# Patient Record
Sex: FEMALE | Race: WHITE | URBAN - METROPOLITAN AREA
[De-identification: names, ages, dates, MRNs, and addresses within clinical notes are randomized per-mention and may not be internally consistent; named-entity substitution may affect disease eponyms.]

---

## 2017-07-05 ENCOUNTER — TELEPHONE (OUTPATIENT)
Dept: TRANSPLANT | Facility: CLINIC | Age: 42
End: 2017-07-05

## 2017-07-05 NOTE — TELEPHONE ENCOUNTER
"Logged in as: [email or web address]. Logout.  Incomplete   Pending   Registered   Archived Change Log Done Living Kidney Donor Evaluation Completed: 2017 13:10:40 CT Updated: 2017 13:11:09 CT  Donor Name: Frances Najera MRN: [id number] Note: : 1975 Age: 41Gender: Female Donor Height: 5  4\" Weight (lb): 180 BMI: 30.9  Donor Race:  Ethnicity: Not / Donor Preferred Language: English  Required?: No Current Marital Status:   Demographics: Home Address: [address] City: [place] State: [place] Zip: [address] Country: [place]  Best Phone: [phone] Alt Phone: Donor Email: [email or web address] Best Phone Type: Mobile Alt Phone Type:   Preferred Contact Time(s): 09:00 AM-11:00 AM, 11:00 AM-1:00 PM, 1:00 PM-4:00 PM Preferred Contact Day(s): Mon, Tue, Wed, Thur, Fri  Donor Screen: PASSED Donor Referred by: Social Media Donor self reported ABO: A  Recipient Information: Recipient Name (Last, First): Henrry Marks Recipient :    1952  ... Donor Relationship: Met through social media Recipient Diagnosis: Recipient ABO:   MEDICAL HISTORY:  None Reported  MEDICATIONS:  None Reported  SURGICAL HISTORY:    Tubal Ligation  Uterine and/or Ovarian Surgery, NOS  ALLERGIES:  Hydrocodone/APAP : Rash  SOCIAL HISTORY:  EtOH: Rare (1-2 drinks/month)  Illicit Drug Use: Denies  Tobacco: Denies  SELF-REPORTED FUNCTIONAL STATUS:  \"I am able to participate in moderate recreational activities like golf, double tennis, dancing, throwing a baseball or football\"  Exercise (>3X per week)  REVIEW OF ORGAN SYSTEMS: Airway or Lungs: No Blood Disorder: No Cancer: No Diabetes,Thyroid,Adrenal,Endocrine Disorder: No Digestive or Liver: No Female Health: No Heart or Circulatory System: No Immune Diseases: No Kidneys and Bladder: No Muscles,Bones,Joints: No Neuro: No Psych: No  FAMILY HISTORY: Confirmed:  Cancer (Mother)  Heart Disease (Father)  Hypertension " (Father)  Denied:  Diabetes (denies)  Kidney Disease (denies)  Kidney Stones (denies)  DONOR INFORMATION:  Level of Education: Some college education Employment Status: Unemployed Medical Insurance Status: Has medical insurance Current Accommodation: Owns own home/apartment Living Arrangement: With spouse Allow Disclosure to Recipient: Yes Paired Kidney Exchange Education Level: General idea Paired Kidney Exchange Participation Consent: Unsure Donor Motivation: Highly motivated donor  HIGH RISK BEHAVIOR:  Blood transfusion < 12 months. (NO)  Commercial sex < 12 months. (NO)  Illicit IV drug use < 5yrs. (NO)  Other high risk sexual contact < 12 months. (NO)  EMERGENCY CONTACT INFORMATION:  Primary Secondary First Name: Sherlyn Last Name: Reinaldo Phone Number: [phone] Relationship: Child  First Name: Franco  Last Name: Reinaldo Phone Number: [phone] Relationship: Spouse  REASON FOR DONATION:   I like to help those in need.   PHYSICIAN CONTACT INFORMATION:  PCP  Name: Dr Araya    City: [place] State: [place]  Phone: [phone]  ADDITIONAL NOTES:   REVIEWED BY:    Carlyn  TODAY'S DATE:   07/05/2017  ... Done  I like to help those in need.

## 2017-07-21 ENCOUNTER — TELEPHONE (OUTPATIENT)
Dept: TRANSPLANT | Facility: CLINIC | Age: 42
End: 2017-07-21

## 2017-07-21 NOTE — TELEPHONE ENCOUNTER
Frances req lab orders be faxed to her clinic at [phone]=done. Also asked the BP orders be mailed to her=done.

## 2017-07-31 ENCOUNTER — TELEPHONE (OUTPATIENT)
Dept: TRANSPLANT | Facility: CLINIC | Age: 42
End: 2017-07-31

## 2017-07-31 ENCOUNTER — DOCUMENTATION ONLY (OUTPATIENT)
Dept: TRANSPLANT | Facility: CLINIC | Age: 42
End: 2017-07-31

## 2017-07-31 NOTE — TELEPHONE ENCOUNTER
"Phase 1's OK. BMI=33--her ht is 1 \" shorter at clinic then what she reported prev. Now spoke with her and will lose wt. Just came home from trip to [place] so it may be cause for wt gain. Average TDC=379. Will now come for eval. Has CD. [place]. Has only been to [place] within the past 3 months. Confirmed interested in direct/PEP.  "

## 2017-08-01 DIAGNOSIS — Z00.5 TRANSPLANT DONOR EVALUATION: ICD-10-CM

## 2017-08-04 ENCOUNTER — ALLIED HEALTH/NURSE VISIT (OUTPATIENT)
Dept: TRANSPLANT | Facility: CLINIC | Age: 42
End: 2017-08-04
Attending: TRANSPLANT SURGERY

## 2017-08-04 ENCOUNTER — APPOINTMENT (OUTPATIENT)
Dept: TRANSPLANT | Facility: CLINIC | Age: 42
End: 2017-08-04
Attending: TRANSPLANT SURGERY

## 2017-08-04 ENCOUNTER — INFUSION THERAPY VISIT (OUTPATIENT)
Dept: INFUSION THERAPY | Facility: CLINIC | Age: 42
End: 2017-08-04
Attending: INTERNAL MEDICINE

## 2017-08-04 ENCOUNTER — OFFICE VISIT (OUTPATIENT)
Dept: NEPHROLOGY | Facility: CLINIC | Age: 42
End: 2017-08-04
Attending: TRANSPLANT SURGERY

## 2017-08-04 ENCOUNTER — OFFICE VISIT (OUTPATIENT)
Dept: TRANSPLANT | Facility: CLINIC | Age: 42
End: 2017-08-04
Attending: TRANSPLANT SURGERY

## 2017-08-04 ENCOUNTER — OFFICE VISIT (OUTPATIENT)
Dept: TRANSPLANT | Facility: CLINIC | Age: 42
End: 2017-08-04
Attending: TRANSPLANT SURGERY
Payer: MEDICAID

## 2017-08-04 ENCOUNTER — DOCUMENTATION ONLY (OUTPATIENT)
Dept: GASTROENTEROLOGY | Facility: CLINIC | Age: 42
End: 2017-08-04

## 2017-08-04 VITALS
HEART RATE: 80 BPM | DIASTOLIC BLOOD PRESSURE: 78 MMHG | TEMPERATURE: 98.1 F | WEIGHT: 183.7 LBS | OXYGEN SATURATION: 97 % | HEIGHT: 65 IN | BODY MASS INDEX: 30.6 KG/M2 | SYSTOLIC BLOOD PRESSURE: 108 MMHG

## 2017-08-04 VITALS
SYSTOLIC BLOOD PRESSURE: 118 MMHG | BODY MASS INDEX: 30.6 KG/M2 | HEART RATE: 78 BPM | OXYGEN SATURATION: 97 % | WEIGHT: 183.7 LBS | DIASTOLIC BLOOD PRESSURE: 80 MMHG | HEIGHT: 65 IN | TEMPERATURE: 98.1 F

## 2017-08-04 VITALS — SYSTOLIC BLOOD PRESSURE: 108 MMHG | DIASTOLIC BLOOD PRESSURE: 76 MMHG

## 2017-08-04 DIAGNOSIS — Z00.5 TRANSPLANT DONOR EVALUATION: ICD-10-CM

## 2017-08-04 DIAGNOSIS — Z00.5 TRANSPLANT DONOR EVALUATION: Primary | ICD-10-CM

## 2017-08-04 LAB
ABO + RH BLD: NORMAL
ALBUMIN SERPL-MCNC: 3.8 G/DL (ref 3.4–5)
ALBUMIN UR-MCNC: NEGATIVE MG/DL
ALP SERPL-CCNC: 92 U/L (ref 40–150)
ALT SERPL W P-5'-P-CCNC: 66 U/L (ref 0–50)
ANION GAP SERPL CALCULATED.3IONS-SCNC: 9 MMOL/L (ref 3–14)
APPEARANCE UR: CLEAR
APTT PPP: 38 SEC (ref 22–37)
AST SERPL W P-5'-P-CCNC: 40 U/L (ref 0–45)
B-HCG SERPL-ACNC: <1 IU/L (ref 0–5)
BACTERIA #/AREA URNS HPF: ABNORMAL /HPF
BILIRUB SERPL-MCNC: 0.4 MG/DL (ref 0.2–1.3)
BILIRUB UR QL STRIP: NEGATIVE
BLD GP AB SCN SERPL QL: NORMAL
BLOOD BANK CMNT PATIENT-IMP: NORMAL
BUN SERPL-MCNC: 11 MG/DL (ref 7–30)
CALCIUM SERPL-MCNC: 8.8 MG/DL (ref 8.5–10.1)
CHLORIDE SERPL-SCNC: 105 MMOL/L (ref 94–109)
CHOLEST SERPL-MCNC: 257 MG/DL
CMV IGG SERPL QL IA: NORMAL AI (ref 0–0.8)
CO2 SERPL-SCNC: 23 MMOL/L (ref 20–32)
COLOR UR AUTO: YELLOW
CREAT SERPL-MCNC: 0.74 MG/DL (ref 0.52–1.04)
CREAT UR-MCNC: 114 MG/DL
EBV VCA IGG SER QL IA: 6.9 AI (ref 0–0.8)
EBV VCA IGM SER QL IA: 0.3 AI (ref 0–0.8)
ERYTHROCYTE [DISTWIDTH] IN BLOOD BY AUTOMATED COUNT: 12.9 % (ref 10–15)
GFR SERPL CREATININE-BSD FRML MDRD: 86 ML/MIN/1.7M2
GLUCOSE SERPL-MCNC: 91 MG/DL (ref 70–99)
GLUCOSE UR STRIP-MCNC: NEGATIVE MG/DL
HBA1C MFR BLD: 5.3 % (ref 4.3–6)
HBV CORE AB SERPL QL IA: NONREACTIVE
HBV SURFACE AB SERPL IA-ACNC: 81.34 M[IU]/ML
HBV SURFACE AG SERPL QL IA: NONREACTIVE
HCT VFR BLD AUTO: 38 % (ref 35–47)
HCV AB SERPL QL IA: NORMAL
HDLC SERPL-MCNC: 71 MG/DL
HGB BLD-MCNC: 12.5 G/DL (ref 11.7–15.7)
HGB UR QL STRIP: NEGATIVE
HIV 1+2 AB+HIV1 P24 AG SERPL QL IA: NORMAL
INR PPP: 1 (ref 0.86–1.14)
KETONES UR STRIP-MCNC: NEGATIVE MG/DL
LDLC SERPL CALC-MCNC: 170 MG/DL
LEUKOCYTE ESTERASE UR QL STRIP: NEGATIVE
MCH RBC QN AUTO: 29.6 PG (ref 26.5–33)
MCHC RBC AUTO-ENTMCNC: 32.9 G/DL (ref 31.5–36.5)
MCV RBC AUTO: 90 FL (ref 78–100)
MICROALBUMIN UR-MCNC: 9 MG/L
MICROALBUMIN/CREAT UR: 7.94 MG/G CR (ref 0–25)
MUCOUS THREADS #/AREA URNS LPF: PRESENT /LPF
NITRATE UR QL: NEGATIVE
NONHDLC SERPL-MCNC: 186 MG/DL
PH UR STRIP: 5 PH (ref 5–7)
PHOSPHATE SERPL-MCNC: 3.4 MG/DL (ref 2.5–4.5)
PLATELET # BLD AUTO: 250 10E9/L (ref 150–450)
POTASSIUM SERPL-SCNC: 3.7 MMOL/L (ref 3.4–5.3)
PROT SERPL-MCNC: 7.6 G/DL (ref 6.8–8.8)
PROT UR-MCNC: <0.05 G/L
PROT/CREAT 24H UR: NORMAL G/G CR (ref 0–0.2)
RBC # BLD AUTO: 4.23 10E12/L (ref 3.8–5.2)
RBC #/AREA URNS AUTO: 1 /HPF (ref 0–2)
SODIUM SERPL-SCNC: 137 MMOL/L (ref 133–144)
SP GR UR STRIP: 1.01 (ref 1–1.03)
SPECIMEN EXP DATE BLD: NORMAL
SPECIMEN EXP DATE BLD: NORMAL
SQUAMOUS #/AREA URNS AUTO: 1 /HPF (ref 0–1)
T PALLIDUM IGG+IGM SER QL: NEGATIVE
TRIGL SERPL-MCNC: 80 MG/DL
URATE SERPL-MCNC: 4.8 MG/DL (ref 2.6–6)
URN SPEC COLLECT METH UR: ABNORMAL
UROBILINOGEN UR STRIP-MCNC: 0 MG/DL (ref 0–2)
WBC # BLD AUTO: 6 10E9/L (ref 4–11)
WBC #/AREA URNS AUTO: 1 /HPF (ref 0–2)

## 2017-08-04 PROCEDURE — 85027 COMPLETE CBC AUTOMATED: CPT | Performed by: INTERNAL MEDICINE

## 2017-08-04 PROCEDURE — 86480 TB TEST CELL IMMUN MEASURE: CPT | Performed by: INTERNAL MEDICINE

## 2017-08-04 PROCEDURE — 86665 EPSTEIN-BARR CAPSID VCA: CPT | Mod: 91 | Performed by: INTERNAL MEDICINE

## 2017-08-04 PROCEDURE — 86704 HEP B CORE ANTIBODY TOTAL: CPT | Performed by: INTERNAL MEDICINE

## 2017-08-04 PROCEDURE — 86850 RBC ANTIBODY SCREEN: CPT | Performed by: INTERNAL MEDICINE

## 2017-08-04 PROCEDURE — 84100 ASSAY OF PHOSPHORUS: CPT | Performed by: INTERNAL MEDICINE

## 2017-08-04 PROCEDURE — 85730 THROMBOPLASTIN TIME PARTIAL: CPT | Performed by: INTERNAL MEDICINE

## 2017-08-04 PROCEDURE — 36415 COLL VENOUS BLD VENIPUNCTURE: CPT | Performed by: INTERNAL MEDICINE

## 2017-08-04 PROCEDURE — 87340 HEPATITIS B SURFACE AG IA: CPT | Performed by: INTERNAL MEDICINE

## 2017-08-04 PROCEDURE — 82542 COL CHROMOTOGRAPHY QUAL/QUAN: CPT | Performed by: INTERNAL MEDICINE

## 2017-08-04 PROCEDURE — 86665 EPSTEIN-BARR CAPSID VCA: CPT | Performed by: INTERNAL MEDICINE

## 2017-08-04 PROCEDURE — 40000866 ZZHCL STATISTIC HIV 1/2 ANTIGEN/ANTIBODY PRETRANSPLANT ONLY: Performed by: INTERNAL MEDICINE

## 2017-08-04 PROCEDURE — 80061 LIPID PANEL: CPT | Performed by: INTERNAL MEDICINE

## 2017-08-04 PROCEDURE — 84156 ASSAY OF PROTEIN URINE: CPT | Performed by: INTERNAL MEDICINE

## 2017-08-04 PROCEDURE — 84550 ASSAY OF BLOOD/URIC ACID: CPT | Performed by: INTERNAL MEDICINE

## 2017-08-04 PROCEDURE — 85610 PROTHROMBIN TIME: CPT | Performed by: INTERNAL MEDICINE

## 2017-08-04 PROCEDURE — 25000128 H RX IP 250 OP 636: Mod: ZF | Performed by: INTERNAL MEDICINE

## 2017-08-04 PROCEDURE — 86901 BLOOD TYPING SEROLOGIC RH(D): CPT | Performed by: INTERNAL MEDICINE

## 2017-08-04 PROCEDURE — 83036 HEMOGLOBIN GLYCOSYLATED A1C: CPT | Performed by: INTERNAL MEDICINE

## 2017-08-04 PROCEDURE — 82043 UR ALBUMIN QUANTITATIVE: CPT | Performed by: INTERNAL MEDICINE

## 2017-08-04 PROCEDURE — 86706 HEP B SURFACE ANTIBODY: CPT | Performed by: INTERNAL MEDICINE

## 2017-08-04 PROCEDURE — 81001 URINALYSIS AUTO W/SCOPE: CPT | Performed by: INTERNAL MEDICINE

## 2017-08-04 PROCEDURE — 80053 COMPREHEN METABOLIC PANEL: CPT | Performed by: INTERNAL MEDICINE

## 2017-08-04 PROCEDURE — 86905 BLOOD TYPING RBC ANTIGENS: CPT | Performed by: INTERNAL MEDICINE

## 2017-08-04 PROCEDURE — 93010 ELECTROCARDIOGRAM REPORT: CPT | Performed by: INTERNAL MEDICINE

## 2017-08-04 PROCEDURE — 86644 CMV ANTIBODY: CPT | Performed by: INTERNAL MEDICINE

## 2017-08-04 PROCEDURE — 86900 BLOOD TYPING SEROLOGIC ABO: CPT | Performed by: TRANSPLANT SURGERY

## 2017-08-04 PROCEDURE — 84702 CHORIONIC GONADOTROPIN TEST: CPT | Performed by: INTERNAL MEDICINE

## 2017-08-04 PROCEDURE — 86803 HEPATITIS C AB TEST: CPT | Performed by: INTERNAL MEDICINE

## 2017-08-04 PROCEDURE — 86900 BLOOD TYPING SEROLOGIC ABO: CPT | Mod: 91 | Performed by: INTERNAL MEDICINE

## 2017-08-04 PROCEDURE — 86780 TREPONEMA PALLIDUM: CPT | Performed by: INTERNAL MEDICINE

## 2017-08-04 RX ADMIN — IOHEXOL 5 ML: 300 INJECTION, SOLUTION INTRAVENOUS at 07:44

## 2017-08-04 ASSESSMENT — PAIN SCALES - GENERAL
PAINLEVEL: NO PAIN (0)
PAINLEVEL: NO PAIN (0)

## 2017-08-04 NOTE — MR AVS SNAPSHOT
"              After Visit Summary   2017    Frances Najera    MRN: [id number]           Patient Information     Date Of Birth          1975        Visit Information        Provider Department      2017 9:00 AM Penelope Sibley, PAULA [facility] Trans[facility]        Today's Diagnoses     Transplant donor evaluation    -  1       Follow-ups after your visit        Who to contact     If you have questions or need follow up information about today's clinic visit or your schedule please contact [facility] SOLID ORGAN TRANSPLANT directly at [phone].  Normal or non-critical lab and imaging results will be communicated to you by Aicenthart, letter or phone within 4 business days after the clinic has received the results. If you do not hear from us within 7 days, please contact the clinic through Halo Bever[employer] or phone. If you have a critical or abnormal lab result, we will notify you by phone as soon as possible.  Submit refill requests through Araca or call your pharmacy and they will forward the refill request to us. Please allow 3 business days for your refill to be completed.          Additional Information About Your Visit        MyChart Information     Araca lets you send messages to your doctor, view your test results, renew your prescriptions, schedule appointments and more. To sign up, go to [email or web address] . Click on \"Log in\" on the left side of the screen, which will take you to the Welcome page. Then click on \"Sign up Now\" on the right side of the page.     You will be asked to enter the access code listed below, as well as some personal information. Please follow the directions to create your username and password.     Your access code is: [email or web address]  Expires: 10/31/2017  6:30 AM     Your access code will  in 90 days. If you need help or a new code, please call your [facility] clinic or [phone].        Care EveryWhere ID     This is your Care EveryWhere ID. This could be used " by other organizations to access your [facility] medical records  [id number]         Blood Pressure from Last 3 Encounters:   08/04/17 108/76   08/04/17 118/80   08/04/17 108/78    Weight from Last 3 Encounters:   08/04/17 83.3 kg (183 lb 11.2 oz)   08/04/17 83.3 kg (183 lb 11.2 oz)              Today, you had the following     No orders found for display       Primary Care Provider Office Phone # Fax #    [facility] [phone]       [address] [facility] [address]        Equal Access to Services     [facility] Hospital[facility] : Hadii laureano yates hadasho Soberto, waaxda luqadaha, qaybta kaalmada adeyandelyada, colin martell . So [facility] [phone].    ATENCIÓN: Si habla español, tiene a delgado disposición servicios gratuitos de asistencia lingüística. Llame al [phone].    We comply with applicable federal civil rights laws and [place] laws. We do not discriminate on the basis of race, color, national origin, age, disability sex, sexual orientation or gender identity.            Thank you!     Thank you for choosing [facility] SOLID ORGAN TRANSPLANT  for your care. Our goal is always to provide you with excellent care. Hearing back from our patients is one way we can continue to improve our services. Please take a few minutes to complete the written survey that you may receive in the mail after your visit with us. Thank you!             Your Updated Medication List - Protect others around you: Learn how to safely use, store and throw away your medicines at www.disposemymeds.org.      Notice  As of 8/4/2017 11:59 PM    You have not been prescribed any medications.

## 2017-08-04 NOTE — PROGRESS NOTES
Psychosocial Evaluation  Living Organ Donation per OPTN Policy 14.1.A  Organ Type: Unrelated Kidney Donor  Presenting Information:  Frances Najera presents to the [facility] to complete a psychosocial evaluation since she is interested in becoming a kidney donor for Henrry Marks.  Frances is a 41 year old  female with long brunette hair, and casually dressed in jeans and a blouse.  She had tattoos on her shoulder, arm and both feet/ankles.  Her mood was euthymic.  Thought process logical and goal directed.  She was soft spoken.  She came to the transplant center alone today.  She was pleasant and appeared forthcoming in sharing information.  PERSONAL BACKGROUND:  Current Living Situation: Frances lives in a single family home in [place], [place] which is 2 [place]/2 hours from the transplant center.  She lives with her  and five of her six children.    Education/Employment/Financial Situation: Frances has a high school education and also attended technical college.  She is a homemaker.  Her  works as a  and is able to take time off of work as needed.  Frances has medical insurance.    Family History: Frances was reared in [place], [place].  Her parents are both living and .  Her mother has stage 4 pancreatic cancer.  She has three brothers and three sisters.  One brother  from a brain aneurism five years ago, at the age of 46.  Frances and her , Franco, have been  for ten years.  She has six children.  The oldest, age 21, lives outside of the home.  The others are ages 18, 11, 9, 4 and 2 years of age.  She reports that they are all healthy and doing well.    General Health: She considers herself to be generally healthy. She has  had 6 cesareans, a tubal ligation, and a tubal ligation reversal.  She states that she has recovered well from all surgeries.    Mental Health: She denies any current or past mental health issues. She has never had reason to seek  out the support of a therapist or psychiatrist.      Alcohol and Drug Use/Abuse/Dependency: She drinks one or two alcoholic drinks per month.  She does not use street drugs.    Cigarette Use: Denies    Legal: Denies    Coping Strategies/Support System: She enjoys spending time outdoors and looking at ideas on [employer].  Spirituality is significant.  She enjoys helping people and is always helping care of other people's children.  She has donated blood in the past.    DONOR SPECIFIC INFORMATION:  Relationship to Recipient: The recipient is Henrry Marks.  She learned about his need for a kidney transplant one month ago, after reading a Facebook posted in the [facility] section, where his daughter wrote that they were looking for kidney donor for her father.  She has never met Henrry nor has she had a conversation with him.  She doesn't know his daughter, but did call her to learn more about how to go about being evaluated as a potential donor.  She isn't sure what his condition is, but learned that other family members have the same condition so are unable to be donors.  She knows that he is on dialysis three times per week.    Decision Process/Motivation to Donate: Frances describes her decision to consider donation as automatic.  She has a friend who donated a kidney to a brother 12 years ago and her friends said that it was one of the best things she ever did.  She thinks it's important to do things for other people and not always be so self centered.  She wishes that if someone were able to help her mother that they would, and it's important for her to help someone else if she is able to.  She states her  is supportive.  She describes him as somewhat passive.  He has not read information about donation nor has he watched the donor DVD.  We discussed the importance of doing this with him and the older children so that all are aware of the process and the risks.  She anticipates her , along with her  two older daughters would be able to care for her and the younger children.    PREPARATION FOR DONATION, RECOVERY, AND POTENTIAL SHORT-LONG-TERM OUTCOMES:  Understanding of the Living Donation Process:   We discussed the role of the donor  and Independent Donor Advocate.  Short and long term medical and psychosocial risks to both, donor and recipient were reviewed and she is capable of understanding the risks.  High risk behaviors as defined by [place] Public Health Services (PHS) 2013 that have potential to increase risk of disease transmission were reviewed and she denies high risk behaviors. Post surgical restrictions (2 weeks no driving, 6 weeks no lifting over 10 lbs) were reviewed and she appears capable of adhering to the post surgical requirements. The need for a caregiver was discussed and she expects her  and older daughters would be able to care for her.  The risk of poor psychosocial outcome including problems with body image, post-surgery depression or anxiety, or feelings of emotional distress or bereavement if recipient experiences any recurrent disease, poor outcome or death was reviewed.  Additionally, potential financial implications, including the risk of having difficulty obtaining health care insurance, life insurance, disability insurance, or long term care insurance were reviewed, as were available donor grants to assist with donor related expenses.  We discussed applying for NLDAC which she should consider if otherwise approved for donation.    We also discussed some unique issues that arise with paired kidney donation, which include the uncertainty of the timing and the importance of having a employment situation and support system that is able to provide sustained support and flexibility.    Frances Najera appears capable of understanding this information and making an informed medical decision.    Impressions/Recommendations:   Frances Najera  is highly motivated to donate a  kidney  to Henrry Marks  .  Her decision to donate is free of inducement, coercion, or other undue pressure.   Her housing, finances and employment are stable.  No current/active mental health or chemical abuse issues were identified.  The need for a caregiver was reviewed and she is able to identify a plan to meet her post operative care needs.  I asked that she review the information and video with her family before donation to ensure that they have information about donation and recovery.  I encouraged her to give thoughtful consideration to the recovery process and making sure her family understands the risks and recovery.  She appears capable of making an informed medical decision.  No psychosocial contraindications to living organ donation were identified and  I support Frances Najera s desire to donate a kidney to Henrry Marks.         Contact Information:   MARIE KIM, [facility]  Clinical  and Independent Donor Advocate  [employer]  Phone - [phone]  Pager - [phone]  [email or web address]      Time Spent: 60 minutes      Living Kidney Donor Consent per OPTN Policy 14.3.A for Independent Living Donor Advocate (GABRIELE)    Written assurance has been obtained from the potential donor that he/she:   Is willing to donate  Is free from inducement and coercion  Has been informed that the he/she may decline to donate at any time  Has been informed that transplant centers must:   A) Offer donors an opportunity to discontinue the donor consent or evaluation process in a way that is protected and confidential  B) Provide an independent living donor advocate (GABRIELE) to assist the potential donor during this process    The following was presented to the potential donor in a language in which the potential donor is able to engage in meaningful dialogue:   Education and instruction about all phases of the living donation process including:   Consent  Medical and psychosocial evaluations  Pre and post  operative care  Required post operative follow up  Disclosure that the [facility] will take all reasonable precautions to provide confidentiality for the donor/recipient  Disclosure that it is a federal crime for any person to knowingly acquire, obtain or otherwise transfer any human organ for valuable consideration  Disclosure that the [facility] must provide an independent living donor advocate (GABRIELE)  Disclosure that health information obtained during the evaluation is subject to the same regulations as all records and could reveal conditions that must be reported to local, state, or federal public health authorities  Disclosure that the [facility] is required to report living donor follow up information at 6 months, 1 year, and 2 years, and that the potential donor must commit to post operative follow up testing coordinated by the [facility]    Disclosure has been provided that these risks may be transient or permanent & include but are not limited to:  Potential psychosocial risks:  Problems with body image  Post-surgery depression or anxiety  Feelings of emotional distress or bereavement if recipient experiences any recurrent disease or in the event of the recipient s death  Impact of donation on the donor s lifestyle    Potential financial impacts:  Personal expenses of travel, housing, , lost wages related to donation might not be reimbursed. However, resources may be available to defray some donation-related expenses   Need for life-long follow up at the donor s expense  Loss of employment or income  Negative impact on the ability to obtain future employment  Negative impact on the ability to obtain, maintain, or afford health, disability, and life insurance  Future health problems experienced by living donors following donation may not be covered by the recipient s insurance    Contact Information:  MARIE KIM, LICSW  Clinical  and Independent  Donor Advocate  [facility]  Phone - [phone]  Pager - [phone]  [email or web address]      Time Spent: 60 minutes

## 2017-08-04 NOTE — LETTER
8/4/2017     RE: Frances Najera   [facility]  [place] [address]     Dear Colleague,    Thank you for referring your patient, Frances Najera, to the [facility] NEPHROLOGY at [facility]. Please see a copy of my visit note below.    Assessment and Plan:  1. Prospective kidney transplant donor with a few issues that need to be addressed prior to donation. Patient's blood pressure is acceptable at this visit, kidney function appears to be acceptable with Iohexol pending, and urinalysis is bland.    1. Dyslipidemia we discussed the need to follow up with PCP to get this under control with life style modification and perhaps medications   2. Elevated PTT, with the history to miscarriage x 6 would like to send mixing studies, lupus and anticoagulant and GAVI     Discussed the risks of donating a kidney, including the surgical risk and the possible risks of living with one kidney.    Education about expected post-donation kidney function and how chronic kidney disease (CKD) and end stage kidney disease (ESKD) might potentially impact the donor in the future, include, but not limited to:       - On average, donors will have 25-35% permanent loss of kidney function at donation.       - Baseline risk of ESKD may slightly exceed that of members of the general population with the same demographic profile.       - Donor risks must be interpreted in light of known epidemiology of both CKD or ESKD, such as that CKD generally develops in midlife (40-50 years old) and ESKD generally develops after age 60.       - Medical evaluation of young potential donors cannot predict lifetime risk of CKD or ESKD.       - Donors may be at higher risk for CKD if they sustain damage to the remaining kidney.       - Development of CKD and progression of ESKD may be more rapid with only 1 kidney.       - Some type of kidney replacement therapy, either kidney transplant or dialysis, is required when reaching  ESKD.    Potential medical or surgical risks include, but not limited to:       - Death.       - Scars, pain, fatigue, and other consequences typical of any surgical procedure.       - Decreased kidney function.       - Abdominal or bowel symptoms, such as bloating and nausea, and developing bowel obstruction.       - Kidney failure (ESKD) and the need for a kidney transplant or dialysis for the donor.       - Impact of obesity, hypertension, or other donor-specific medical conditions on morbidity and mortality of the potential donor.    Patients overall evaluation will be discussed with the transplant team and a final recommendation on the patients' suitability to be a kidney transplant donor will be made at that time.    Consult:  Frances Najera was seen in consultation at the request of Dr. Reynaldo Ramon for evaluation as a potential kidney transplant donor.    Reason for Visit:  Frances Najera is a 41 year old female who presents for a kidney donor evaluation.  Patient would like to donate to someone in need.    HPI:    Ms. Najera is a 41 year old lady with no known chronic medical conditions. She had 11 pregnancies with 5 miscarriages, 5 term babies and 1 premature. All children are doing well without known kidney disease. Ms. Najera was full term and 7 Lb at birth. She has no complaints at all. She is interested in donation to help a person in need. She takes no prescribed medications.          Kidney Disease Hx:       h/o Kidney Problems: No  Family h/o Genetic Kidney Disease: No       h/o HTN: No    Usual BP: 110/70       h/o Protein in Urine: No  h/o Blood in Urine: No       h/o Kidney Stones: No  h/o Kidney Injury: No       h/o Recurrent UTI: No  h/o Genitourinary Problems: No       h/o Chronic NSAID Use: No         Other Medical Hx:       h/o DM: No   h/o Gestational DM: No       h/o Preeclampsia: No          h/o Gastrointestinal, Pancreas or Liver Problems: No       h/o Lung or Heart Problems: No        h/o Hematologic Problems: No  h/o Bleeding or Clotting Problems: No       h/o Cancer: No       h/o Infection Problems: No         Skin Cancer Risk:       h/o more than 50 moles: No       h/o extensive sun exposure: No       h/o melanoma: No       Family h/o melanoma: No         Mental Health Assessment:       h/o Depression: No       h/o Psychiatric Illness: No       h/o Suicidal Attempt(s): No    ROS:   A comprehensive review of systems was obtained and negative, except as noted in the HPI or PMH.    PMH:   History was taken from the patient as noted below.  Past Medical History:   Diagnosis Date     H/O rosacea        PSH:   Past Surgical History:   Procedure Laterality Date      SECTION       Personal or family history of anesthesia problems: No    Family Hx:   Family History   Problem Relation Age of Onset     Pancreatic Cancer Mother      at age of 72     Bronchitis Mother      Aneurysm Brother           Specific Family Hx:       FH of DM: No       FH of CAD: Yes   FH of HTN: Yes        FH of Cancer: Yes   FH of Kidney Cancer: No    Personal Hx:   Social History     Social History     Marital status:      Spouse name: N/A     Number of children: N/A     Years of education: N/A     Occupational History     Not on file.     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                 Living Arrangements: lives with their spouse       Social Support: Yes       Presence of increased risk for disease transmission behaviors as defined by PHS guidelines: No        Allergies:  Allergies   Allergen Reactions     Codeine Rash       Medications:  Prior to Admission medications    Not on File       Vitals:  Vital Signs  "8/4/2017 8/4/2017 8/4/2017   Systolic - 118 118   Diastolic - 80 80   Pulse - 80 78   Temperature - 98.1 98.1   Weight (LB) - 183 lb 11.2 oz 183 lb 11.2 oz   Height - 5' 5\" 5' 5\"   BMI (Calculated) - 30.63 30.63   Pain 0 - -   O2 - 97 97       Exam:   GENERAL APPEARANCE: alert and no distress  HENT: mouth without ulcers or lesions  LYMPHATICS: no cervical or supraclavicular nodes  RESP: lungs clear to auscultation - no rales, rhonchi or wheezes  CV: regular rhythm, normal rate, no rub, no murmur  EDEMA: no LE edema bilaterally  ABDOMEN: soft, nondistended, nontender, bowel sounds normal  MS: extremities normal - no gross deformities noted, no evidence of inflammation in joints, no muscle tenderness  SKIN: no rash    Results:   Labs and imaging were ordered for this visit and reviewed by me.  Recent Results (from the past 336 hour(s))   TXP External Lab Result    Collection Time: 07/31/17  8:42 AM   Result Value Ref Range    Color Urine (External) Yellow     Appearance Urine (External) Clear     Specific Gravity Urine (External) 1.020 1.002 - 1.030    pH Urine (External) 7.5 4.6 - 8.0    Leukocyte Esterase Urine (External) NEGATIVE     Nitrites Urine (External) NEGATIVE     Protein Albumin Ur (External) NEGATIVE mg/dL    Glucose Urine (External) NEGATIVE mg/dL    Ketones Urine (External) NEGATIVE     Urobilinogen (External) NORM     Bilirubin Urine (External) NEGATIVE     Blood Urine (External) NEGATIVE     Hyaline Cast Urine (External) 0-2 /LPF    Granular Cast Urine (External) UNDETECTED /LPF    WBC Urine (External) 0-2 /HPF    RBC Urine (External) 0-2 /HPF    Renal Tub Epi Urine (External) UNDETECTED /HPF    Squamous Epithelial Urine (External) 6-10 (A) /HPF    Mucus Urine (External) UNDETECTED     Bacteria Urine (External) TRACE     Crystal Urine (External) UNDETECTED     Amorphous Crystals (External) UNDETECTED     Creatinine Urine Random (External) 157 mg/dL    Hemoglobin (External) 12.6 11.7 - 15.5 g/dL    " Creatinine (External) 0.6 0.4 - 1.0 mg/dL    GFR Estimated (External) >90 60.0 - 150.0 mL/min    Glucose (External) 87 70 - 99 mg/dL    ABO (External) A     Microalbumin Urine (External) 0.6 mg/dL    Microalbumin Urine mg/g Cr (External) 4 0 - 30 ug/mg   ABO/Rh type and screen    Collection Time: 08/04/17  7:26 AM   Result Value Ref Range    ABO A     RH(D)  Neg     Antibody Screen Neg     Test Valid Only At       [facility]    Specimen Expires 08/07/2017    Lipid Profile    Collection Time: 08/04/17  7:27 AM   Result Value Ref Range    Cholesterol 257 (H) <200 mg/dL    Triglycerides 80 <150 mg/dL    HDL Cholesterol 71 >49 mg/dL    LDL Cholesterol Calculated 170 (H) <100 mg/dL    Non HDL Cholesterol 186 (H) <130 mg/dL   Comprehensive metabolic panel    Collection Time: 08/04/17  7:27 AM   Result Value Ref Range    Sodium 137 133 - 144 mmol/L    Potassium 3.7 3.4 - 5.3 mmol/L    Chloride 105 94 - 109 mmol/L    Carbon Dioxide 23 20 - 32 mmol/L    Anion Gap 9 3 - 14 mmol/L    Glucose 91 70 - 99 mg/dL    Urea Nitrogen 11 7 - 30 mg/dL    Creatinine 0.74 0.52 - 1.04 mg/dL    GFR Estimate 86 >60 mL/min/1.7m2    GFR Estimate If Black >90   GFR Calc   >60 mL/min/1.7m2    Calcium 8.8 8.5 - 10.1 mg/dL    Bilirubin Total 0.4 0.2 - 1.3 mg/dL    Albumin 3.8 3.4 - 5.0 g/dL    Protein Total 7.6 6.8 - 8.8 g/dL    Alkaline Phosphatase 92 40 - 150 U/L    ALT 66 (H) 0 - 50 U/L    AST 40 0 - 45 U/L   HCG quantitative pregnancy    Collection Time: 08/04/17  7:27 AM   Result Value Ref Range    HCG Quantitative Serum <1 0 - 5 IU/L   Phosphorus    Collection Time: 08/04/17  7:27 AM   Result Value Ref Range    Phosphorus 3.4 2.5 - 4.5 mg/dL   Hemoglobin A1c    Collection Time: 08/04/17  7:27 AM   Result Value Ref Range    Hemoglobin A1C 5.3 4.3 - 6.0 %   INR    Collection Time: 08/04/17  7:27 AM   Result Value Ref Range    INR 1.00 0.86 - 1.14   Partial thromboplastin time     Collection Time: 08/04/17  7:27 AM   Result Value Ref Range    PTT 38 (H) 22 - 37 sec   CBC with platelets    Collection Time: 08/04/17  7:27 AM   Result Value Ref Range    WBC 6.0 4.0 - 11.0 10e9/L    RBC Count 4.23 3.8 - 5.2 10e12/L    Hemoglobin 12.5 11.7 - 15.7 g/dL    Hematocrit 38.0 35.0 - 47.0 %    MCV 90 78 - 100 fl    MCH 29.6 26.5 - 33.0 pg    MCHC 32.9 31.5 - 36.5 g/dL    RDW 12.9 10.0 - 15.0 %    Platelet Count 250 150 - 450 10e9/L   Uric acid    Collection Time: 08/04/17  7:27 AM   Result Value Ref Range    Uric Acid 4.8 2.6 - 6.0 mg/dL   Routine UA with microscopic    Collection Time: 08/04/17  7:30 AM   Result Value Ref Range    Color Urine Yellow     Appearance Urine Clear     Glucose Urine Negative NEG mg/dL    Bilirubin Urine Negative NEG    Ketones Urine Negative NEG mg/dL    Specific Gravity Urine 1.012 1.003 - 1.035    Blood Urine Negative NEG    pH Urine 5.0 5.0 - 7.0 pH    Protein Albumin Urine Negative NEG mg/dL    Urobilinogen mg/dL 0.0 0.0 - 2.0 mg/dL    Nitrite Urine Negative NEG    Leukocyte Esterase Urine Negative NEG    Source Midstream Urine     WBC Urine 1 0 - 2 /HPF    RBC Urine 1 0 - 2 /HPF    Bacteria Urine Few (A) NEG /HPF    Squamous Epithelial /HPF Urine 1 0 - 1 /HPF    Mucous Urine Present (A) NEG /LPF   Microalbumin quantitative random urine    Collection Time: 08/04/17  7:30 AM   Result Value Ref Range    Creatinine Urine 114 mg/dL    Albumin Urine mg/L 9 mg/L    Albumin Urine mg/g Cr 7.94 0 - 25 mg/g Cr   Protein  random urine    Collection Time: 08/04/17  7:30 AM   Result Value Ref Range    Protein Random Urine <0.05 g/L    Protein Total Urine g/gr Creatinine Unable to calculate due to low value 0 - 0.2 g/g Cr   EKG 12-lead complete w/read - Clinics    Collection Time: 08/04/17 10:37 AM   Result Value Ref Range    Interpretation ECG Click View Image link to view waveform and result      Again, thank you for allowing me to participate in the care of your patient.       Sincerely,    Petey Escobedo MD

## 2017-08-04 NOTE — PROGRESS NOTES
Saw Frances in clinic [facility] donor evaluation.    Came alone  Has offered to be donor to E.. Who she does not know,  Is a responder  Discussed surgery hospitalization and recovery.  Know when and how to obtain evaluation results and the process for scheduling surgery.  Reviewed SRTR datasheet.  Signed consent for donor evaluation.  Donor Signed BRITTANY.  Requested routine cancer screening tests:   Scheduled for Mammo in September.  Not sure if she needs pap.  Will talk to her OB at September physical..Questions answered.  Approx. time spent:  30 minutes  Donor has medical insurance:  Yes    I talked to Frances about PEP.  Will send consent for her to review.  Will send [place] swabs for processing when she is approved.      Living Kidney Donor Consent per OPTN Policy 12.0 for Transplant Coordinators    Written assurance has been obtained from the patient that the donor:   1) Is willing to donate  2) Is free from inducement and coercion  3) Has been informed that the donor may decline to donate at any time  4) Has been informed that transplant centers must:     A) Offer donors an opportunity to discontinue the donor consent or evaluation process in a way that is protected and confidential    B) Provide an independent donor advocate (TITO) to assist the potential donor during this process    The following was presented in a language in which donor is able to engage in meaningful dialogue and was reviewed and discussed with the patient by the transplant coordinator and the physician.     The following has been provided:   Education and instruction about all phases of the living donation process includin) Consent  2) The donor must undergo medical and psychosocial evaluations  3) Disclosure that the recovery hospital will take all reasonable precautions to provide confidentiality for the donor/recipient  4) Disclosure that it is a federal crime for any person to knowingly acquire, obtain or otherwise transfer any  human organ for valuable consideration  5) The transplant hospital may refuse the potential donor, and the donor could be evaluated by another transplant program with different selection criteria  6) Data from the most recent SRTR center-specific reports:     A) National 1-year patient and graft survival rates    B) Hospital s 1-year patient and graft survival rates    C) Notification about all CMS outcome requirements not being met by the recovery and recipient s hospitals    The following has been provided:   1) Education about expected post-donation kidney function and how chronic kidney disease and end-stage renal disease might potentially impact the donor in the future to include:    A) On average, donors will have 25-35% permanent loss of kidney function at donation    B) Baseline risk of End Stage Renal Disease (ESRD)  does not exceed that of members of general population with same demographic profile    C) Donor risks must be interpreted in light of known epidemiology of both Chronic Kidney Disease (CKD) or ESRD    D) CKD generally develops in midlife (40-50 years old)    E) ESRD generally develops after age 60    F) Medical evaluation of young potential donor cannot predict lifetime risk  2) Donors may be at higher risk for CKD if they sustain damage to the remaining kidney. 3) Development of CKD and progression to ESRD may be more rapid with only 1 kidney  4) Dialysis is required when reaching ESRD  5) Current practice prioritizes prior living kidney donors who became kidney transplant candidates  6) Alternate procedures or courses of treatment for the recipient, including  donor transplant    A) A  donor kidney may become available for the recipient before donor evaluation is complete or transplant occurs    B) Any transplant candidate may have risk factors for increased morbidity or mortality that are not disclosed to the potential donor  7) The patient will receive a thorough medical and  psychosocial evaluation  8) Health information obtained during the evaluation is subject to the same regulations as all records and could reveal conditions that must be reported to local, state, or federal public health authorities  9) The [facility] [place], is required to report living donor follow up information at 6, 12, and 24 months  10) Potential donors must commit to post operative follow up testing coordinated by the [facility]  11) Any infectious disease or malignancy pertinent to acute recipient care discovered during the potential donor s first two years of follow up care:    A) Will be disclosed to the donor    B) May need to be reported to local, state or federal public health authorities    C) Will be disclosed to their recipient s transplant center, and    D) Will be reported through the OPTN Improving Patient Safety Portal    Disclosure has been provided that these risks may be transient or permanent & include but are not limited to potential medical or surgical risks:    Death    Scars, pain, fatigue, and other consequences typical of any surgical procedure    Decreased kidney function    Abdominal or bowel symptoms such as bloating and nausea, and developing bowel obstruction    Kidney failure and the need for dialysis or kidney transplant for the donor  Impact of obesity, hypertension or other donor-specific medical conditions on morbidity and mortality of the potential donor    CAITLYN Rodriguez, RN     Transplant Coordinator  [phone]  [phone]

## 2017-08-04 NOTE — MR AVS SNAPSHOT
After Visit Summary   8/4/2017    Frances Najera    MRN: [id number]           Patient Information     Date Of Birth          1975        Visit Information        Provider Department      8/4/2017 11:30 AM Reynaldo Ramon MD [facility] Solid Organ Transplant        Today's Diagnoses     Transplant donor evaluation    -  1       Follow-ups after your visit        Your next 10 appointments already scheduled     Aug 04, 2017 12:40 PM CDT   (Arrive by 12:25 PM)   CT RENAL ANGIO with UCCT1   [facility] CT ([facility])    [address]  [address] [place] [address] [phone]           Please bring any scans or X-rays taken at other hospitals, if similar tests were done. Also bring a list of your medicines, including vitamins, minerals and over-the-counter drugs. It is safest to leave personal items at home.  Be sure to tell your doctor:   If you have any allergies.   If there s any chance you are pregnant.   If you are breastfeeding.   If you have any special needs.  You will have contrast for this exam. To prepare:   Do not eat or drink for 2 hours before your exam. If you need to take medicine, you may take it with small sips of water. (We may ask you to take liquid medicine as well.)   The day before your exam, drink extra fluids at least six 8-ounce glasses (unless your doctor tells you to restrict your fluids).  Patients over 70 or patients with diabetes or kidney problems:   If you haven t had a blood test (creatinine test) within the last 30 days, go to your clinic or Diagnostic Imaging Department for this test.  If you have diabetes:   If your kidney function is normal, continue taking your metformin (Avandamet, Glucophage, Glucovance, Metaglip) on the day of your exam.   If your kidney function is abnormal, wait 48 hours before restarting this medicine.  Please wear loose clothing, such as a sweat suit or jogging clothes. Avoid snaps,  "zippers and other metal. We may ask you to undress and put on a hospital gown.  If you have any questions, please call the Imaging Department where you will have your exam.            Aug 04, 2017  1:05 PM CDT   (Arrive by 12:50 PM)   XR CHEST 2 VIEWS with UCXR1   [facility] Xray ([facility])    [address]  [address]  [place] [address] [phone]           Please bring a list of your current medicines to your exam. (Include vitamins, minerals and over-thecounter medicines.) Leave your valuables at home.  Tell your doctor if there is a chance you may be pregnant.  You do not need to do anything special for this exam.              Future tests that were ordered for you today     Open Future Orders        Priority Expected Expires Ordered    EKG 12-lead complete with read (future) Routine  8/4/2018 8/4/2017            Who to contact     If you have questions or need follow up information about today's clinic visit or your schedule please contact [facility] SOLID ORGAN TRANSPLANT directly at [phone].  Normal or non-critical lab and imaging results will be communicated to you by Tellyhart, letter or phone within 4 business days after the clinic has received the results. If you do not hear from us within 7 days, please contact the clinic through Revealt or phone. If you have a critical or abnormal lab result, we will notify you by phone as soon as possible.  Submit refill requests through iMER or call your pharmacy and they will forward the refill request to us. Please allow 3 business days for your refill to be completed.          Additional Information About Your Visit        iMER Information     iMER lets you send messages to your doctor, view your test results, renew your prescriptions, schedule appointments and more. To sign up, go to [email or web address] . Click on \"Log in\" on the left side of the screen, which will take you to the Welcome " "page. Then click on \"Sign up Now\" on the right side of the page.     You will be asked to enter the access code listed below, as well as some personal information. Please follow the directions to create your username and password.     Your access code is: [email or web address]  Expires: 10/31/2017  6:30 AM     Your access code will  in 90 days. If you need help or a new code, please call your [facility] or [phone].        Care EveryWhere ID     This is your Care EveryWhere ID. This could be used by other organizations to access your [place] medical records  [id number]         Blood Pressure from Last 3 Encounters:   17 108/76   17 118/80   17 108/78    Weight from Last 3 Encounters:   17 83.3 kg (183 lb 11.2 oz)   17 83.3 kg (183 lb 11.2 oz)              Today, you had the following     No orders found for display       Primary Care Provider Office Phone # Fax #    [facility] [phone]       [address] [address]        Equal Access to Services     LOPEZ LOW AH: Hadii aad ku hadasho Soximenaali, waaxda luqadaha, qaybta kaalmada adeegyada, colin underwoodn satnam guerrier. So [facility] [phone].    ATENCIÓN: Si habla español, tiene a delgado disposición servicios gratuitos de asistencia lingüística. Bobby al [phone].    We comply with applicable federal civil rights laws and [place] laws. We do not discriminate on the basis of race, color, national origin, age, disability sex, sexual orientation or gender identity.            Thank you!     Thank you for choosing [facility]GAN TRANSPL[facility]  for your care. Our goal is always to provide you with excellent care. Hearing back from our patients is one way we can continue to improve our services. Please take a few minutes to complete the written survey that you may receive in the mail after your visit with us. Thank you!             Your Updated Medication List - Protect " others around you: Learn how to safely use, store and throw away your medicines at www.disposemymeds.org.      Notice  As of 8/4/2017 12:36 PM    You have not been prescribed any medications.

## 2017-08-04 NOTE — PROGRESS NOTES
Donor Iohexol test    Frances Najera presents today to [facility] for a Donor Iohexol test.      Progress note:  ID verified by name and .     The following information was verified with the patient:  Female Patients is there any possibility of being pregnant No  Is there a history of allergy (skin rash, swelling, ect) to:   A.  Iodine (except skin reactions to betadine): No   B. Intravenous radio-contrast agents: No   C. Seafood No    R.N. provided patient with educational handout regarding timed test. Yes    20 gauge PIV placed in R AC.  Iohexol administered.  Following iohexol administration extension set replaced.  PIV left in place for blood draws and CT this afternoon    Medication administered:  Iohexol (Omnipaque 300mg iodine/ml concentration) 5 mls.    Start time: 0745  Stop time: 07    Evaluation nurse in transplant to draw labs at 2 and 4 hours post iohexol administration.  Patient given a slip with the times to get labs drawn and verbalized understanding of the plan.    Patient tolerated the procedure:  Yes    After the infusion patient was discharged to the next appointment.

## 2017-08-04 NOTE — MR AVS SNAPSHOT
"              After Visit Summary   2017    Frances Najera    MRN: [id number]           Patient Information     Date Of Birth          1975        Visit Information        Provider Department      2017 7:00 AM UC 44 ATC; UC SPEC INFUSION [facility] Specialty and Procedure        Today's Diagnoses     Transplant donor evaluation    -  1       Follow-ups after your visit        Who to contact     If you have questions or need follow up information about today's clinic visit or your schedule please contact [facility] SPECIALTY AND PROCEDURE directly at [phone].  Normal or non-critical lab and imaging results will be communicated to you by FanDuelhart, letter or phone within 4 business days after the clinic has received the results. If you do not hear from us within 7 days, please contact the clinic through Flodesign Sonicst or phone. If you have a critical or abnormal lab result, we will notify you by phone as soon as possible.  Submit refill requests through CromoUp or call your pharmacy and they will forward the refill request to us. Please allow 3 business days for your refill to be completed.          Additional Information About Your Visit        MyChart Information     CromoUp lets you send messages to your doctor, view your test results, renew your prescriptions, schedule appointments and more. To sign up, go to [email or web address] . Click on \"Log in\" on the left side of the screen, which will take you to the Welcome page. Then click on \"Sign up Now\" on the right side of the page.     You will be asked to enter the access code listed below, as well as some personal information. Please follow the directions to create your username and password.     Your access code is: [email or web address]  Expires: 10/31/2017  6:30 AM     Your access code will  in 90 days. If you need help or a new code, please call your [place] clinic or [phone].        Care EveryWhere " ID     This is your Care EveryWhere ID. This could be used by other organizations to access your [place] medical records  [id number]         Blood Pressure from Last 3 Encounters:   08/04/17 108/76   08/04/17 118/80   08/04/17 108/78    Weight from Last 3 Encounters:   08/04/17 83.3 kg (183 lb 11.2 oz)   08/04/17 83.3 kg (183 lb 11.2 oz)              We Performed the Following     Iohexol        Primary Care Provider Office Phone # Fax #    [facility] [phone]       [address] [facility] [address]        Equal Access to Services     LOPEZ LOW : Hadii laureano raygozao Yusra, waaxda luqadaha, qaybta kaalmada allie, colin guerrier. So [facility] [phone].    ATENCIÓN: Si habla español, tiene a delgado disposición servicios gratuitos de asistencia lingüística. Llame al [phone].    We comply with applicable federal civil rights laws and [place] laws. We do not discriminate on the basis of race, color, national origin, age, disability sex, sexual orientation or gender identity.            Thank you!     Thank you for choosing [facility] SPECIALTY AND PROCEDURE  for your care. Our goal is always to provide you with excellent care. Hearing back from our patients is one way we can continue to improve our services. Please take a few minutes to complete the written survey that you may receive in the mail after your visit with us. Thank you!             Your Updated Medication List - Protect others around you: Learn how to safely use, store and throw away your medicines at www.disposemymeds.org.      Notice  As of 8/4/2017 11:59 PM    You have not been prescribed any medications.

## 2017-08-04 NOTE — PROGRESS NOTES
NUTRITION KIDNEY DONOR EVALUATION  Medical Nutrition Therapy    Weight and BMI:  Current BMI: 30.5  BMI is outside criteria of <30 for kidney donation  Current Weight: 183 lbs  Goal Weight <180 lbs  Ideal weight loss needed: 3 lbs    8 Year Risk of Diabetes:  </= 3%       Visit Type: Initial Assessment    Frances Najera referred by Dr. Ramon for MNT related to potential kidney donor evaluation    Patient accompanied by self    Nutrition Assessment:  Anthropometrics  Height:   65  in   BMI:    30.5    Weight Status:Obesity Grade I BMI 30-34.9   Weight:  183 lbs            IBW (lbs): 125  % IBW: 146    Wt Hx: No major changes. Pt reports she was on vacation recently and has gained a few lbs.     Adj/dosing BW: 140 lbs/63 kg    Goal BMI <30 prior to donation or <180 lbs (or per MD)        Recent Labs   Lab Test  08/04/17   0727   CHOL  257*   HDL  71   LDL  170*   TRIG  80       BG = 91  A1C = 5.3      Prediction of Incident Diabetes Mellitus in Middle-aged Adults: The [place] Offspring Study  Edgar Lopez MD; James B. Meigs, MD, MPH; Bozena Sargent, PhD; Yeni Mclean MD, MPH; Khang Akers MD; Vik Nugent Sr,   PhD  Pt's estimated risk for T2DM (per Table 6 above)  Pt received points for the following criteria: BMI>25, BMI30  Total points: 7  8-Year risk of T2DM: </= 3%    Nutrition History  Dining out/food not made at home: 1-2x/week  Vitamins, Supplements, Herbals, Pertinent Meds: none     Recall:  B: oats or cold cereal  L: snacks on bananas, fruit, chocolate chips   D: chix and veggies, some potatoes; occasional hamburgers or brats   Sn: none  Beverages: flavored water, some coffee   ETOH (1 drink = 12 oz beer, 5 oz wine, 1.5 oz liquor): none    Physical Activity  Workout videos a few times/week     Nutrition Prescription  Estimated Energy Needs: 6220-4883 kcals (20-25 Kcal/Kg)  Justification: obese    Estimated Protein Needs: 50-63 protein (0.8-1 g pro/Kg)  Justification:  maintenance    Estimated Fluid Needs:  (1 mL/Kcal)  Justification: maintenance     Fiber: 25-30 g/day     Nutrition Diagnosis:  Food and nutrition related knowledge deficit r/t pre transplant donor eval pt AEB pt verbalized not hearing pre/post transplant diet guidelines.    Nutrition Intervention:  Nutrition education provided:  Discussed weight criteria for donation and need to avoid weight gain.   Reviewed overall healthy diet guidelines for pre and post kidney donation. Discussed maintenance of a healthy weight, Na+ intake <3000 mg/day, and avoidance of herbal supplements post donation d/t unknown effects on the kidney. Pt asked if biotin for hair/skin/nails would be ok - yes. Reviewed lipid profile and ways to improve levels - continued activity, 5% weight loss, adequate fiber intake.     Patient Understanding: Pt verbalized understanding of education provided.  Expected Compliance: Good  Follow-Up Plans: PRN     Nutrition Goals:  1. Na+ <3000 mg/day  2. BMI to remain <30 or <180 lbs for donation vs avoid weight gain     Provided pt with contact info.    Time spent with patient: 15 minutes.  Sharron Cui, KAMERON, LD

## 2017-08-04 NOTE — PROGRESS NOTES
Assessment and Plan:  1. Prospective kidney transplant donor with a few issues that need to be addressed prior to donation. Patient's blood pressure is acceptable at this visit, kidney function appears to be acceptable with Iohexol pending, and urinalysis is bland.    1. Dyslipidemia we discussed the need to follow up with PCP to get this under control with life style modification and perhaps medications   2. Elevated PTT, with the history to miscarriage x 6 would like to send mixing studies, lupus and anticoagulant and GAVI     Discussed the risks of donating a kidney, including the surgical risk and the possible risks of living with one kidney.    Education about expected post-donation kidney function and how chronic kidney disease (CKD) and end stage kidney disease (ESKD) might potentially impact the donor in the future, include, but not limited to:       - On average, donors will have 25-35% permanent loss of kidney function at donation.       - Baseline risk of ESKD may slightly exceed that of members of the general population with the same demographic profile.       - Donor risks must be interpreted in light of known epidemiology of both CKD or ESKD, such as that CKD generally develops in midlife (40-50 years old) and ESKD generally develops after age 60.       - Medical evaluation of young potential donors cannot predict lifetime risk of CKD or ESKD.       - Donors may be at higher risk for CKD if they sustain damage to the remaining kidney.       - Development of CKD and progression of ESKD may be more rapid with only 1 kidney.       - Some type of kidney replacement therapy, either kidney transplant or dialysis, is required when reaching ESKD.    Potential medical or surgical risks include, but not limited to:       - Death.       - Scars, pain, fatigue, and other consequences typical of any surgical procedure.       - Decreased kidney function.       - Abdominal or bowel symptoms, such as bloating and  nausea, and developing bowel obstruction.       - Kidney failure (ESKD) and the need for a kidney transplant or dialysis for the donor.       - Impact of obesity, hypertension, or other donor-specific medical conditions on morbidity and mortality of the potential donor.    Patients overall evaluation will be discussed with the transplant team and a final recommendation on the patients' suitability to be a kidney transplant donor will be made at that time.    Consult:  Frances Najera was seen in consultation at the request of Dr. Reynaldo Ramon for evaluation as a potential kidney transplant donor.    Reason for Visit:  Frances Najera is a 41 year old female who presents for a kidney donor evaluation.  Patient would like to donate to someone in need.    HPI:    Ms. Najera is a 41 year old lady with no known chronic medical conditions. She had 11 pregnancies with 5 miscarriages, 5 term babies and 1 premature. All children are doing well without known kidney disease. Ms. Najera was full term and 7 Lb at birth. She has no complaints at all. She is interested in donation to help a person in need. She takes no prescribed medications.          Kidney Disease Hx:       h/o Kidney Problems: No  Family h/o Genetic Kidney Disease: No       h/o HTN: No    Usual BP: 110/70       h/o Protein in Urine: No  h/o Blood in Urine: No       h/o Kidney Stones: No  h/o Kidney Injury: No       h/o Recurrent UTI: No  h/o Genitourinary Problems: No       h/o Chronic NSAID Use: No         Other Medical Hx:       h/o DM: No   h/o Gestational DM: No       h/o Preeclampsia: No          h/o Gastrointestinal, Pancreas or Liver Problems: No       h/o Lung or Heart Problems: No       h/o Hematologic Problems: No  h/o Bleeding or Clotting Problems: No       h/o Cancer: No       h/o Infection Problems: No         Skin Cancer Risk:       h/o more than 50 moles: No       h/o extensive sun exposure: No       h/o melanoma: No       Family h/o melanoma:  "No         Mental Health Assessment:       h/o Depression: No       h/o Psychiatric Illness: No       h/o Suicidal Attempt(s): No    ROS:   A comprehensive review of systems was obtained and negative, except as noted in the HPI or PMH.    PMH:   History was taken from the patient as noted below.  Past Medical History:   Diagnosis Date     H/O rosacea        PSH:   Past Surgical History:   Procedure Laterality Date      SECTION       Personal or family history of anesthesia problems: No    Family Hx:   Family History   Problem Relation Age of Onset     Pancreatic Cancer Mother      at age of 72     Bronchitis Mother      Aneurysm Brother           Specific Family Hx:       FH of DM: No       FH of CAD: Yes   FH of HTN: Yes        FH of Cancer: Yes   FH of Kidney Cancer: No    Personal Hx:   Social History     Social History     Marital status:      Spouse name: N/A     Number of children: N/A     Years of education: N/A     Occupational History     Not on file.     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                 Living Arrangements: lives with their spouse       Social Support: Yes       Presence of increased risk for disease transmission behaviors as defined by PHS guidelines: No        Allergies:  Allergies   Allergen Reactions     Codeine Rash       Medications:  Prior to Admission medications    Not on File       Vitals:  Vital Signs 2017   Systolic - 118 118   Diastolic - 80 80   Pulse - 80 78   Temperature - 98.1 98.1   Weight (LB) - 183 lb 11.2 oz 183 lb 11.2 oz   Height - 5' 5\" 5' 5\"   BMI (Calculated) - 30.63 30.63   Pain 0 - -   O2 - 97 97       Exam:   GENERAL " APPEARANCE: alert and no distress  HENT: mouth without ulcers or lesions  LYMPHATICS: no cervical or supraclavicular nodes  RESP: lungs clear to auscultation - no rales, rhonchi or wheezes  CV: regular rhythm, normal rate, no rub, no murmur  EDEMA: no LE edema bilaterally  ABDOMEN: soft, nondistended, nontender, bowel sounds normal  MS: extremities normal - no gross deformities noted, no evidence of inflammation in joints, no muscle tenderness  SKIN: no rash    Results:   Labs and imaging were ordered for this visit and reviewed by me.  Recent Results (from the past 336 hour(s))   TXP External Lab Result    Collection Time: 07/31/17  8:42 AM   Result Value Ref Range    Color Urine (External) Yellow     Appearance Urine (External) Clear     Specific Gravity Urine (External) 1.020 1.002 - 1.030    pH Urine (External) 7.5 4.6 - 8.0    Leukocyte Esterase Urine (External) NEGATIVE     Nitrites Urine (External) NEGATIVE     Protein Albumin Ur (External) NEGATIVE mg/dL    Glucose Urine (External) NEGATIVE mg/dL    Ketones Urine (External) NEGATIVE     Urobilinogen (External) NORM     Bilirubin Urine (External) NEGATIVE     Blood Urine (External) NEGATIVE     Hyaline Cast Urine (External) 0-2 /LPF    Granular Cast Urine (External) UNDETECTED /LPF    WBC Urine (External) 0-2 /HPF    RBC Urine (External) 0-2 /HPF    Renal Tub Epi Urine (External) UNDETECTED /HPF    Squamous Epithelial Urine (External) 6-10 (A) /HPF    Mucus Urine (External) UNDETECTED     Bacteria Urine (External) TRACE     Crystal Urine (External) UNDETECTED     Amorphous Crystals (External) UNDETECTED     Creatinine Urine Random (External) 157 mg/dL    Hemoglobin (External) 12.6 11.7 - 15.5 g/dL    Creatinine (External) 0.6 0.4 - 1.0 mg/dL    GFR Estimated (External) >90 60.0 - 150.0 mL/min    Glucose (External) 87 70 - 99 mg/dL    ABO (External) A     Microalbumin Urine (External) 0.6 mg/dL    Microalbumin Urine mg/g Cr (External) 4 0 - 30 ug/mg   ABO/Rh  type and screen    Collection Time: 08/04/17  7:26 AM   Result Value Ref Range    ABO A     RH(D)  Neg     Antibody Screen Neg     Test Valid Only At       [facility]    Specimen Expires 08/07/2017    Lipid Profile    Collection Time: 08/04/17  7:27 AM   Result Value Ref Range    Cholesterol 257 (H) <200 mg/dL    Triglycerides 80 <150 mg/dL    HDL Cholesterol 71 >49 mg/dL    LDL Cholesterol Calculated 170 (H) <100 mg/dL    Non HDL Cholesterol 186 (H) <130 mg/dL   Comprehensive metabolic panel    Collection Time: 08/04/17  7:27 AM   Result Value Ref Range    Sodium 137 133 - 144 mmol/L    Potassium 3.7 3.4 - 5.3 mmol/L    Chloride 105 94 - 109 mmol/L    Carbon Dioxide 23 20 - 32 mmol/L    Anion Gap 9 3 - 14 mmol/L    Glucose 91 70 - 99 mg/dL    Urea Nitrogen 11 7 - 30 mg/dL    Creatinine 0.74 0.52 - 1.04 mg/dL    GFR Estimate 86 >60 mL/min/1.7m2    GFR Estimate If Black >90   GFR Calc   >60 mL/min/1.7m2    Calcium 8.8 8.5 - 10.1 mg/dL    Bilirubin Total 0.4 0.2 - 1.3 mg/dL    Albumin 3.8 3.4 - 5.0 g/dL    Protein Total 7.6 6.8 - 8.8 g/dL    Alkaline Phosphatase 92 40 - 150 U/L    ALT 66 (H) 0 - 50 U/L    AST 40 0 - 45 U/L   HCG quantitative pregnancy    Collection Time: 08/04/17  7:27 AM   Result Value Ref Range    HCG Quantitative Serum <1 0 - 5 IU/L   Phosphorus    Collection Time: 08/04/17  7:27 AM   Result Value Ref Range    Phosphorus 3.4 2.5 - 4.5 mg/dL   Hemoglobin A1c    Collection Time: 08/04/17  7:27 AM   Result Value Ref Range    Hemoglobin A1C 5.3 4.3 - 6.0 %   INR    Collection Time: 08/04/17  7:27 AM   Result Value Ref Range    INR 1.00 0.86 - 1.14   Partial thromboplastin time    Collection Time: 08/04/17  7:27 AM   Result Value Ref Range    PTT 38 (H) 22 - 37 sec   CBC with platelets    Collection Time: 08/04/17  7:27 AM   Result Value Ref Range    WBC 6.0 4.0 - 11.0 10e9/L    RBC Count 4.23 3.8 - 5.2 10e12/L    Hemoglobin 12.5 11.7 - 15.7  g/dL    Hematocrit 38.0 35.0 - 47.0 %    MCV 90 78 - 100 fl    MCH 29.6 26.5 - 33.0 pg    MCHC 32.9 31.5 - 36.5 g/dL    RDW 12.9 10.0 - 15.0 %    Platelet Count 250 150 - 450 10e9/L   Uric acid    Collection Time: 08/04/17  7:27 AM   Result Value Ref Range    Uric Acid 4.8 2.6 - 6.0 mg/dL   Routine UA with microscopic    Collection Time: 08/04/17  7:30 AM   Result Value Ref Range    Color Urine Yellow     Appearance Urine Clear     Glucose Urine Negative NEG mg/dL    Bilirubin Urine Negative NEG    Ketones Urine Negative NEG mg/dL    Specific Gravity Urine 1.012 1.003 - 1.035    Blood Urine Negative NEG    pH Urine 5.0 5.0 - 7.0 pH    Protein Albumin Urine Negative NEG mg/dL    Urobilinogen mg/dL 0.0 0.0 - 2.0 mg/dL    Nitrite Urine Negative NEG    Leukocyte Esterase Urine Negative NEG    Source Midstream Urine     WBC Urine 1 0 - 2 /HPF    RBC Urine 1 0 - 2 /HPF    Bacteria Urine Few (A) NEG /HPF    Squamous Epithelial /HPF Urine 1 0 - 1 /HPF    Mucous Urine Present (A) NEG /LPF   Microalbumin quantitative random urine    Collection Time: 08/04/17  7:30 AM   Result Value Ref Range    Creatinine Urine 114 mg/dL    Albumin Urine mg/L 9 mg/L    Albumin Urine mg/g Cr 7.94 0 - 25 mg/g Cr   Protein  random urine    Collection Time: 08/04/17  7:30 AM   Result Value Ref Range    Protein Random Urine <0.05 g/L    Protein Total Urine g/gr Creatinine Unable to calculate due to low value 0 - 0.2 g/g Cr   EKG 12-lead complete w/read - Clinics    Collection Time: 08/04/17 10:37 AM   Result Value Ref Range    Interpretation ECG Click View Image link to view waveform and result

## 2017-08-04 NOTE — LETTER
8/4/2017       RE: Frances Najera   [facility] [address]  [place] [address]     Dear Colleague,    Thank you for referring your patient, Frances Najera, to the [facility] SOLID ORGAN TRANSPLANT at [facility]. Please see a copy of my visit note below.    RE: Frances Najera  [facility] #[id number]           I saw your patient, Frances Najera, in consultation in our pretransplant clinic. She was here at clinic for evaluation as a possible kidney donor to the father of an individual that she had met and had heard that that person's father needs a kidney .  She had seen our donor video.  Our donor coordinator shared our center-specific results with her.  We discussed:    (1) The risks of donation--including mortality (0.03% risk) and morbidity (approximately 1% risk of major morbidity).  We discussed major reported causes of death after kidney donation (bleeding, infection, pulmonary embolism).  We discussed the potential complications, including:  bleeding requiring reoperation, infection requiring reoperation, pneumonia, bowel obstruction, infection at the site of the incision, hernia at the site of the incision, deep vein thrombosis, deep vein thrombosis with associated pulmonary embolism, and urinary tract infection.  I told her I could not possibly name all of the risks, but that she was at risk for any complications that could occur in any operation.       (2) We also discussed the long-term risks of living with one kidney.  We talked about the new publications suggesting slightly increased long-term risk of ESRD after donor nephrectomy.  I discussed with her the fact that our outcome data is limited to 30-40 years after nephrectomy.    (3) The hospital stay and the fact that if all goes well, discharge would occur within two to three days after donor nephrectomy.  We also discussed the fact that there would be no diet or fluid restrictions (again if all went well), and that the only new  medication that she would be on would be pain medication.  We discussed the fact that most patients are on Tylenol or something similar within five to six days of surgery.      (4) The recovery time after surgery and the restrictions that are necessary:  a) no driving for a couple of weeks (or until she felt that her reaction would be adequate if she had to slam on the brakes; and b) no lifting over 10 pounds or any exercise that would stretch her abdominal muscles for six weeks (to allow the muscles to heal so that she doesn't develop a hernia).  We also discussed return to work, which could occur in approximately three to four weeks if she had a desk job or would require not returning to work for six weeks if the job required any heavy lifting or exercise.  We discussed the potential for not getting her pep and energy back for anywhere from two to six weeks after surgery and how there was a tremendous individual variation in return of full energy level.    (5) The concern about long distance travel for the first couple of weeks post-donation.  We discussed the risks of deep vein thrombosis associated with plane or car travel.  I recommended that, if flying, she should get up and walk around every 30-40 minutes; if driving she should ask the  to stop the car every 30-45 minutes so Ms. Najera could take a short walk.    (6) The fact that the recipient could be on a waiting list for  donor transplant and would ultimately receive a kidney if Ms. Najera did not donate.      Finally, she had some questions about where the incision was and a couple of technical questions regarding the surgery. I did my best to answer them.    I attempted to answer any remaining questions.  I also told her that should she have any questions, she should feel free to contact us.  We would be glad to answer any questions either over the phone or at another clinic visit.  Her transplant coordinator is Pola and  may be reached at [phone].  Thank you for the opportunity to see her.    I spent 40 minutes with this patient.  Over 95% that time was spent in counseling and coordination of care.             Yours truly,               Reynaldo Ramon MD         Professor of Surgery         ([phone])          AJM/st    Again, thank you for allowing me to participate in the care of your patient.      Sincerely,    Reynaldo Ramon MD

## 2017-08-04 NOTE — PROGRESS NOTES
RE: Frances Najera  [facility] #[id number]           I saw your patient, Frances Najera, in consultation in our pretransplant clinic. She was here at clinic for evaluation as a possible kidney donor to the father of an individual that she had met and had heard that that person's father needs a kidney .  She had seen our donor video.  Our donor coordinator shared our center-specific results with her.  We discussed:    (1) The risks of donation--including mortality (0.03% risk) and morbidity (approximately 1% risk of major morbidity).  We discussed major reported causes of death after kidney donation (bleeding, infection, pulmonary embolism).  We discussed the potential complications, including:  bleeding requiring reoperation, infection requiring reoperation, pneumonia, bowel obstruction, infection at the site of the incision, hernia at the site of the incision, deep vein thrombosis, deep vein thrombosis with associated pulmonary embolism, and urinary tract infection.  I told her I could not possibly name all of the risks, but that she was at risk for any complications that could occur in any operation.       (2) We also discussed the long-term risks of living with one kidney.  We talked about the new publications suggesting slightly increased long-term risk of ESRD after donor nephrectomy.  I discussed with her the fact that our outcome data is limited to 30-40 years after nephrectomy.    (3) The hospital stay and the fact that if all goes well, discharge would occur within two to three days after donor nephrectomy.  We also discussed the fact that there would be no diet or fluid restrictions (again if all went well), and that the only new medication that she would be on would be pain medication.  We discussed the fact that most patients are on Tylenol or something similar within five to six days of surgery.      (4) The recovery time after surgery and the restrictions that are necessary:  a) no driving for a couple of  weeks (or until she felt that her reaction would be adequate if she had to slam on the brakes; and b) no lifting over 10 pounds or any exercise that would stretch her abdominal muscles for six weeks (to allow the muscles to heal so that she doesn't develop a hernia).  We also discussed return to work, which could occur in approximately three to four weeks if she had a desk job or would require not returning to work for six weeks if the job required any heavy lifting or exercise.  We discussed the potential for not getting her pep and energy back for anywhere from two to six weeks after surgery and how there was a tremendous individual variation in return of full energy level.    (5) The concern about long distance travel for the first couple of weeks post-donation.  We discussed the risks of deep vein thrombosis associated with plane or car travel.  I recommended that, if flying, she should get up and walk around every 30-40 minutes; if driving she should ask the  to stop the car every 30-45 minutes so Ms. Najera could take a short walk.    (6) The fact that the recipient could be on a waiting list for  donor transplant and would ultimately receive a kidney if Ms. Najera did not donate.      Finally, she had some questions about where the incision was and a couple of technical questions regarding the surgery. I did my best to answer them.    I attempted to answer any remaining questions.  I also told her that should she have any questions, she should feel free to contact us.  We would be glad to answer any questions either over the phone or at another clinic visit.  Her transplant coordinator is Pola and may be reached at [phone].  Thank you for the opportunity to see her.    I spent 40 minutes with this patient.  Over 95% that time was spent in counseling and coordination of care.             Yours truly,               Reynaldo Ramon MD         Professor of  Surgery         ([phone])          AJM/st

## 2017-08-04 NOTE — MR AVS SNAPSHOT
After Visit Summary   8/4/2017    Frances Najera    MRN: [id number]           Patient Information     Date Of Birth          1975        Visit Information        Provider Department      8/4/2017 8:00 AM Sharron Cui RD [facility]        Today's Diagnoses     Transplant donor evaluation    -  1       Follow-ups after your visit        Your next 10 appointments already scheduled     Aug 04, 2017 10:00 AM CDT   (Arrive by 9:30 AM)   Kidney Donor Evaluation with Petey Escobedo MD   [facility]phrology ([facility])    [address] [place] [address]   [phone]            Aug 04, 2017 11:00 AM CDT   (Arrive by 10:45 AM)   ecg with  CV EKG   [facility] Xray ([facility])    [address] [place] [address]   [phone]            Aug 04, 2017 11:30 AM CDT   (Arrive by 11:15 AM)   Kidney Donor Evaluation with Reynaldo Ramon MD   [facility] ([facility])    [address] [place] [id number]   [phone]            Aug 04, 2017 12:40 PM CDT   (Arrive by 12:25 PM)   CT RENAL ANGIO with UCCT1   [facility] CT ([facility])    [address] [place] [address]   [phone]           Please bring any scans or X-rays taken at other hospitals, if similar tests were done. Also bring a list of your medicines, including vitamins, minerals and over-the-counter drugs. It is safest to leave personal items at home.  Be sure to tell your doctor:   If you have any allergies.   If there s any chance you are pregnant.   If you are breastfeeding.   If you have any special needs.  You will have contrast for this exam. To prepare:   Do not eat or drink for 2 hours before your exam. If you need to take medicine, you may take it with small  sips of water. (We may ask you to take liquid medicine as well.)   The day before your exam, drink extra fluids at least six 8-ounce glasses (unless your doctor tells you to restrict your fluids).  Patients over 70 or patients with diabetes or kidney problems:   If you haven t had a blood test (creatinine test) within the last 30 days, go to your clinic or Diagnostic Imaging Department for this test.  If you have diabetes:   If your kidney function is normal, continue taking your metformin (Avandamet, Glucophage, Glucovance, Metaglip) on the day of your exam.   If your kidney function is abnormal, wait 48 hours before restarting this medicine.  Please wear loose clothing, such as a sweat suit or jogging clothes. Avoid snaps, zippers and other metal. We may ask you to undress and put on a hospital gown.  If you have any questions, please call the Imaging Department where you will have your exam.            Aug 04, 2017  1:05 PM CDT   (Arrive by 12:50 PM)   XR CHEST 2 VIEWS with UCXR1   [facility] ([facility])    [address] [address] [phone]           Please bring a list of your current medicines to your exam. (Include vitamins, minerals and over-thecounter medicines.) Leave your valuables at home.  Tell your doctor if there is a chance you may be pregnant.  You do not need to do anything special for this exam.              Who to contact     If you have questions or need follow up information about today's clinic visit or your schedule please contact [facility] directly at [phone].  Normal or non-critical lab and imaging results will be communicated to you by MyChart, letter or phone within 4 business days after the clinic has received the results. If you do not hear from us within 7 days, please contact the clinic through MyChart or phone. If you have a critical or abnormal lab result, we will notify you  "by phone as soon as possible.  Submit refill requests through [employer] or call your pharmacy and they will forward the refill request to us. Please allow 3 business days for your refill to be completed.          Additional Information About Your Visit        [employer] Information     [employer] lets you send messages to your doctor, view your test results, renew your prescriptions, schedule appointments and more. To sign up, go to [email or web address] . Click on \"Log in\" on the left side of the screen, which will take you to the Welcome page. Then click on \"Sign up Now\" on the right side of the page.     You will be asked to enter the access code listed below, as well as some personal information. Please follow the directions to create your username and password.     Your access code is: [email or web address]  Expires: 10/31/2017  6:30 AM     Your access code will  in 90 days. If you need help or a new code, please call your [place] clinic or [phone].        Care EveryWhere ID     This is your Care EveryWhere ID. This could be used by other organizations to access your [place] medical records  [id number]         Blood Pressure from Last 3 Encounters:   17 118/80    Weight from Last 3 Encounters:   17 83.3 kg (183 lb 11.2 oz)              Today, you had the following     No orders found for display       Primary Care Provider Office Phone # Fax #    [facility] [phone] [address] [facility] [address]        Equal Access to Services     LOPEZ LOW AH: Hadii aad ku hadasho Soomaali, waaxda luqadaha, qaybta kaalmada adeegyada, waxay landryin haylebronn satnam correa'autumn . So [facility] [phone].    ATENCIÓN: Si habla español, tiene a delgado disposición servicios gratuitos de asistencia lingüística. Llame al [phone].    We comply with applicable federal civil rights laws and [place] laws. We do not discriminate on the basis of race, color, national origin, age, " disability sex, sexual orientation or gender identity.            Thank you!     Thank you for choosing [facility]  for your care. Our goal is always to provide you with excellent care. Hearing back from our patients is one way we can continue to improve our services. Please take a few minutes to complete the written survey that you may receive in the mail after your visit with us. Thank you!             Your Updated Medication List - Protect others around you: Learn how to safely use, store and throw away your medicines at www.disposemymeds.org.      Notice  As of 8/4/2017  9:08 AM    You have not been prescribed any medications.

## 2017-08-04 NOTE — MR AVS SNAPSHOT
"              After Visit Summary   2017    Frances Najera    MRN: [id number]           Patient Information     Date Of Birth          1975        Visit Information        Provider Department      2017 10:00 AM Petey Escobedo MD [facility] Nephrology        Today's Diagnoses     Transplant donor evaluation    -  1       Follow-ups after your visit        Who to contact     If you have questions or need follow up information about today's clinic visit or your schedule please contact [facility] directly at [phone].  Normal or non-critical lab and imaging results will be communicated to you by MyChart, letter or phone within 4 business days after the clinic has received the results. If you do not hear from us within 7 days, please contact the clinic through [employer] or phone. If you have a critical or abnormal lab result, we will notify you by phone as soon as possible.  Submit refill requests through [email or web address] or call your pharmacy and they will forward the refill request to us. Please allow 3 business days for your refill to be completed.          Additional Information About Your Visit        MyChart Information     [email or web address] lets you send messages to your doctor, view your test results, renew your prescriptions, schedule appointments and more. To sign up, go to [email or web address] . Click on \"Log in\" on the left side of the screen, which will take you to the Welcome page. Then click on \"Sign up Now\" on the right side of the page.     You will be asked to enter the access code listed below, as well as some personal information. Please follow the directions to create your username and password.     Your access code is: [email or web address]  Expires: 10/31/2017  6:30 AM     Your access code will  in 90 days. If you need help or a new code, please call your [place] clinic or [phone].        Care EveryWhere ID     This is your Care EveryWhere ID. This could be used by other organizations " "to access your [place] medical records  [id number]        Your Vitals Were     Pulse Temperature Height Pulse Oximetry BMI (Body Mass Index)       78 98.1  F (36.7  C) (Oral) 1.651 m (5' 5\") 97% 30.57 kg/m2        Blood Pressure from Last 3 Encounters:   08/04/17 108/76   08/04/17 118/80   08/04/17 108/78    Weight from Last 3 Encounters:   08/04/17 83.3 kg (183 lb 11.2 oz)   08/04/17 83.3 kg (183 lb 11.2 oz)              We Performed the Following     EKG CARDIAC - HIM SCAN        Primary Care Provider Office Phone # Fax #    [facility] [phone] [address] [facility] [address]        Equal Access to Services     LOPEZ LOW : Nguyễn fragoso Soberto, waaxda luqadaha, qaybta kaalmada adejhony, colin guerrier. So [facility] [phone].    ATENCIÓN: Si habla español, tiene a delgado disposición servicios gratuitos de asistencia lingüística. Llame al [phone].    We comply with applicable federal civil rights laws and [place] laws. We do not discriminate on the basis of race, color, national origin, age, disability sex, sexual orientation or gender identity.            Thank you!     Thank you for choosing [facility] NEPHROLOGY  for your care. Our goal is always to provide you with excellent care. Hearing back from our patients is one way we can continue to improve our services. Please take a few minutes to complete the written survey that you may receive in the mail after your visit with us. Thank you!             Your Updated Medication List - Protect others around you: Learn how to safely use, store and throw away your medicines at www.disposemymeds.org.      Notice  As of 8/4/2017 11:59 PM    You have not been prescribed any medications.      "

## 2017-08-07 LAB
INTERPRETATION ECG - MUSE: NORMAL
M TB TUBERC IFN-G BLD QL: NEGATIVE
M TB TUBERC IFN-G/MITOGEN IGNF BLD: 0 IU/ML

## 2017-08-08 LAB
BSA: 1.98 M2
IOHEXOL CL UR+SERPL-VRATE: 4.53 MG/DL
IOHEXOL CL UR+SERPL-VRATE: 8.78 MG/DL
IOHEXOL CL UR+SERPL-VRATE: 80 /1.73 M2
IOHEXOL CL UR+SERPL-VRATE: 92 ML/MIN

## 2017-08-09 ENCOUNTER — DOCUMENTATION ONLY (OUTPATIENT)
Dept: TRANSPLANT | Facility: CLINIC | Age: 42
End: 2017-08-09

## 2017-08-09 ENCOUNTER — COMMITTEE REVIEW (OUTPATIENT)
Dept: TRANSPLANT | Facility: CLINIC | Age: 42
End: 2017-08-09

## 2017-08-09 ENCOUNTER — TELEPHONE (OUTPATIENT)
Dept: TRANSPLANT | Facility: CLINIC | Age: 42
End: 2017-08-09

## 2017-08-09 NOTE — TELEPHONE ENCOUNTER
Called to review her evaluation. Discussed the abnormal PTT and ALT values. Reviewed our decision for further blood work due to those elevated values. Discussed our rationale related to her 11 pregnancies and 5 miscarriages. She understood we need to evaluate for clotting disorders or other issues. Is agreeable to have the tests performed. Also reviewed her slight weight loss requirements: 3 pounds for donation. She understands and will go to her PCP for the labs and weight check/documention.

## 2017-08-09 NOTE — PROGRESS NOTES
Pre-Donation Renal CT angio review:    Kidney size discrepancy (>1.5cm difference): No    Left kidney  Parenchymal abnormality: None  Artery: number= 1, early bifurcation: No 3.1 cm, other concern: None  Vein: number= 1, concern: None  Ureter: 1, abnormality: None    Right kidney  Parenchymal abnormality: None  Artery: number= 1, early bifurcation: No 4.7 cm, other concern: None  Vein: number= 1, concern: None  Ureter: 1, abnormality: None    Other significant findings:  No    Kidney selected for donation: Left    Javon Ferrari MD

## 2017-08-09 NOTE — COMMITTEE REVIEW
Abdominal Committee Review Note     Evaluation Date:   Committee Review Date: 8/9/2017    Organ being evaluated for: Kidney    Transplant Phase:   Transplant Status:     Transplant Coordinator: No matching coordinators  Transplant Surgeon:       Referring Physician: Referred Self    Primary Diagnosis:   Secondary Diagnosis:     Committee Review Members:  Nephrology Merrick Hendrickson MD, Petey Escobedo MD   Nutrition Sharron Cui,    Pharmacy Akil Pablo, [facility]    - Clinical Penelope Sibley, [facility], Madison Pacheco, [facility]   Transplant Chaparrita Chowdhury, Reynaldo Ramon MD, Lisseth Bai, SANAZ, Julia Ricci, JAMAL, Vicky Ruiz LPN, Lakshmi Sheppard RN   Transplant Surgery Javon Ferrari MD       Transplant Eligibility: Acceptable Physical Health, Acceptable Mental Health    Committee Review Decision: Needs Re-presentation    Relative Contraindications: None    Absolute Contraindications: None    Committee Chair Reynaldo Ramon MD verbally attested to the committee's decision.    Committee Discussion Details: Presented clinical date. Discusses abnormal values:Dyslipidemia. Have potentilal donor discuss with primary care provider for follow up.   ALT: 66 elevated  PTT: 38 elevated  Discussed her amount of pregnancies related to the amount of miscarriages. (11 pregnancies w/5 miscarriages) Decision made due to 5 miscarriages and the elevated PTT to have additional tests.  1. Mixing studies, lupus and anticoagulant and GAVI.    BMI elevated at 30.63. Dietician recommends minimum of 3 pound weight loss for donation.

## 2017-08-16 ENCOUNTER — TELEPHONE (OUTPATIENT)
Dept: TRANSPLANT | Facility: CLINIC | Age: 42
End: 2017-08-16

## 2017-08-16 ENCOUNTER — DOCUMENTATION ONLY (OUTPATIENT)
Dept: TRANSPLANT | Facility: CLINIC | Age: 42
End: 2017-08-16

## 2017-08-16 NOTE — TELEPHONE ENCOUNTER
Called to explain we need another lab. GAVI. She said she will get that drawn next week. I will send her the order and billing sheet. Will re present once results obtained.

## 2017-08-16 NOTE — PROGRESS NOTES
NLDAC paperwork emailed to Frances briceno.  Also, I asked recipient social workers to please obtain recipient worksheets.  I will file NLDAC when paperwork is returned.

## 2017-08-24 ENCOUNTER — RESULTS ONLY (OUTPATIENT)
Dept: OTHER | Facility: CLINIC | Age: 42
End: 2017-08-24

## 2017-08-24 ENCOUNTER — EXTERNAL ORDER RESULTS (OUTPATIENT)
Dept: TRANSPLANT | Facility: CLINIC | Age: 42
End: 2017-08-24

## 2017-08-24 LAB — SCAN LAB RESULTS (EXTERNAL): NORMAL

## 2017-08-29 LAB
A* LOCUS NMDP: NORMAL
A* LOCUS: NORMAL
A* NMDP: NORMAL
A*: NORMAL
ABTEST METHOD: NORMAL
B* LOCUS NMDP: NORMAL
B* LOCUS: NORMAL
B* NMDP: NORMAL
B*: NORMAL
BW-1: NORMAL
BW-2: NORMAL
C* LOCUS NMDP: NORMAL
C* LOCUS: NORMAL
C* NMDP: NORMAL
C*: NORMAL
DPA1* NMDP: NORMAL
DPA1*: NORMAL
DPB1* NMDP: NORMAL
DPB1*: NORMAL
DQA1*: NORMAL
DQA1*LOCUS NMDP: NORMAL
DQA1*LOCUS: NORMAL
DQB1* LOCUS NMDP: NORMAL
DQB1* LOCUS: NORMAL
DQB1* NMDP: NORMAL
DQB1*: NORMAL
DRB1* LOCUS NMDP: NORMAL
DRB1* LOCUS: NORMAL
DRB1* NMDP: NORMAL
DRB1*: NORMAL
DRB4* LOCUS: NORMAL
DRB5* NMDP: NORMAL
DRB5*: NORMAL
DRSSO TEST METHOD: NORMAL

## 2017-08-30 ENCOUNTER — TELEPHONE (OUTPATIENT)
Dept: TRANSPLANT | Facility: CLINIC | Age: 42
End: 2017-08-30

## 2017-08-30 ENCOUNTER — DOCUMENTATION ONLY (OUTPATIENT)
Dept: TRANSPLANT | Facility: CLINIC | Age: 42
End: 2017-08-30

## 2017-08-30 NOTE — TELEPHONE ENCOUNTER
I called Frances today to let her know that know jaleel was very good.  Mammo and Pap outstanding  She will hAve a mammo on 9-8 and find out if she has had a neg pap in the last 3 years.  Virtual crossmatch neg with no DSA..  Frances would like to donate before Thanksgiving.  I will look for pap and mamm and communicate with Frances after I receive the results.

## 2017-08-30 NOTE — PROGRESS NOTES
Pre-Donation Renal CT angio review:    Kidney size discrepancy (>1.5cm difference): No    Left kidney  Parenchymal abnormality: None  Artery: number= 1, early bifurcation: No, other concern: None  Vein: number= 1, concern: None  Ureter: 1, abnormality: None    Right kidney  Parenchymal abnormality: None  Artery: number= 1, early bifurcation: No, other concern: None  Vein: number= 2, concern: None  Ureter: 1, abnormality: None    Other significant findings:  No    Kidney selected for donation: Left    Javon Ferrari MD

## 2017-09-07 ENCOUNTER — TRANSFERRED RECORDS (OUTPATIENT)
Dept: HEALTH INFORMATION MANAGEMENT | Facility: CLINIC | Age: 42
End: 2017-09-07

## 2017-09-21 ENCOUNTER — TELEPHONE (OUTPATIENT)
Dept: TRANSPLANT | Facility: CLINIC | Age: 42
End: 2017-09-21

## 2017-09-21 NOTE — TELEPHONE ENCOUNTER
Frances,  I have requested the results.  If you have access to the results you can ask the clinic to fax the results to [phone].  If your mammo is neg and your pap is negative within the last 3 years you are approved.  I will then let Ed s team know he has an approved donor and hopefully we can set a date.   Call if you have questions.   Chaparrita [phone]

## 2017-09-22 ENCOUNTER — TELEPHONE (OUTPATIENT)
Dept: TRANSPLANT | Facility: CLINIC | Age: 42
End: 2017-09-22

## 2017-09-22 ENCOUNTER — MEDICAL CORRESPONDENCE (OUTPATIENT)
Dept: TRANSPLANT | Facility: CLINIC | Age: 42
End: 2017-09-22

## 2017-09-22 ENCOUNTER — TRANSFERRED RECORDS (OUTPATIENT)
Dept: HEALTH INFORMATION MANAGEMENT | Facility: CLINIC | Age: 42
End: 2017-09-22

## 2017-09-22 NOTE — TELEPHONE ENCOUNTER
Recipient [facility] paperwork received late this afternoon.  I was able to file víctor today.  Category 1.

## 2017-09-28 ENCOUNTER — TELEPHONE (OUTPATIENT)
Dept: TRANSPLANT | Facility: CLINIC | Age: 42
End: 2017-09-28

## 2017-09-28 DIAGNOSIS — Z00.5 TRANSPLANT DONOR EVALUATION: Primary | ICD-10-CM

## 2017-09-28 NOTE — TELEPHONE ENCOUNTER
I called Frances to check if there was  A possibility of her donatng on 10-19.  She said yes.  She is going to [place] on 112-19 and wanted to know if she woud be able to fly and I said yes.

## 2017-09-28 NOTE — TELEPHONE ENCOUNTER
I called Frances to see if she could donate on 10-19.  Yes,  She will come for ore-op on 10-13.  Recipient will have blood for crossmatch here before 10-13

## 2017-09-29 ENCOUNTER — DOCUMENTATION ONLY (OUTPATIENT)
Dept: TRANSPLANT | Facility: CLINIC | Age: 42
End: 2017-09-29

## 2017-10-02 ENCOUNTER — MEDICAL CORRESPONDENCE (OUTPATIENT)
Dept: TRANSPLANT | Facility: CLINIC | Age: 42
End: 2017-10-02

## 2017-10-02 DIAGNOSIS — Z00.5 TRANSPLANT DONOR EVALUATION: Primary | ICD-10-CM

## 2017-10-13 ENCOUNTER — OFFICE VISIT (OUTPATIENT)
Dept: TRANSPLANT | Facility: CLINIC | Age: 42
End: 2017-10-13
Attending: PHYSICIAN ASSISTANT

## 2017-10-13 ENCOUNTER — ALLIED HEALTH/NURSE VISIT (OUTPATIENT)
Dept: TRANSPLANT | Facility: CLINIC | Age: 42
End: 2017-10-13
Attending: SURGERY

## 2017-10-13 ENCOUNTER — APPOINTMENT (OUTPATIENT)
Dept: TRANSPLANT | Facility: CLINIC | Age: 42
End: 2017-10-13
Attending: SURGERY

## 2017-10-13 ENCOUNTER — OFFICE VISIT (OUTPATIENT)
Dept: TRANSPLANT | Facility: CLINIC | Age: 42
End: 2017-10-13
Attending: SURGERY

## 2017-10-13 VITALS
SYSTOLIC BLOOD PRESSURE: 128 MMHG | OXYGEN SATURATION: 99 % | WEIGHT: 184.9 LBS | DIASTOLIC BLOOD PRESSURE: 85 MMHG | RESPIRATION RATE: 18 BRPM | HEART RATE: 75 BPM | TEMPERATURE: 97.6 F | BODY MASS INDEX: 30.81 KG/M2 | HEIGHT: 65 IN

## 2017-10-13 DIAGNOSIS — Z00.5 TRANSPLANT DONOR EVALUATION: Primary | ICD-10-CM

## 2017-10-13 DIAGNOSIS — Z53.9 ERRONEOUS ENCOUNTER--DISREGARD: Primary | ICD-10-CM

## 2017-10-13 DIAGNOSIS — Z00.5 TRANSPLANT DONOR EVALUATION: ICD-10-CM

## 2017-10-13 LAB
ALBUMIN UR-MCNC: NEGATIVE MG/DL
APPEARANCE UR: CLEAR
B-HCG SERPL-ACNC: <1 IU/L (ref 0–5)
BILIRUB UR QL STRIP: NEGATIVE
COLOR UR AUTO: COLORLESS
CREAT SERPL-MCNC: 0.63 MG/DL (ref 0.52–1.04)
GFR SERPL CREATININE-BSD FRML MDRD: >90 ML/MIN/1.7M2
GLUCOSE UR STRIP-MCNC: NEGATIVE MG/DL
HBV CORE AB SERPL QL IA: NONREACTIVE
HBV SURFACE AG SERPL QL IA: NONREACTIVE
HCV AB SERPL QL IA: NONREACTIVE
HGB BLD-MCNC: 12.5 G/DL (ref 11.7–15.7)
HGB UR QL STRIP: NEGATIVE
HIV 1+2 AB+HIV1 P24 AG SERPL QL IA: NONREACTIVE
KETONES UR STRIP-MCNC: NEGATIVE MG/DL
LEUKOCYTE ESTERASE UR QL STRIP: NEGATIVE
MUCOUS THREADS #/AREA URNS LPF: PRESENT /LPF
NITRATE UR QL: NEGATIVE
PH UR STRIP: 7 PH (ref 5–7)
RBC #/AREA URNS AUTO: <1 /HPF (ref 0–2)
SOURCE: ABNORMAL
SP GR UR STRIP: 1 (ref 1–1.03)
UROBILINOGEN UR STRIP-MCNC: 0 MG/DL (ref 0–2)
WBC #/AREA URNS AUTO: <1 /HPF (ref 0–2)

## 2017-10-13 PROCEDURE — 86704 HEP B CORE ANTIBODY TOTAL: CPT | Performed by: SURGERY

## 2017-10-13 PROCEDURE — 86900 BLOOD TYPING SEROLOGIC ABO: CPT | Performed by: SURGERY

## 2017-10-13 PROCEDURE — 87521 HEPATITIS C PROBE&RVRS TRNSC: CPT | Performed by: SURGERY

## 2017-10-13 PROCEDURE — 36415 COLL VENOUS BLD VENIPUNCTURE: CPT | Performed by: SURGERY

## 2017-10-13 PROCEDURE — 85018 HEMOGLOBIN: CPT | Performed by: SURGERY

## 2017-10-13 PROCEDURE — 86803 HEPATITIS C AB TEST: CPT | Performed by: SURGERY

## 2017-10-13 PROCEDURE — 86901 BLOOD TYPING SEROLOGIC RH(D): CPT | Performed by: SURGERY

## 2017-10-13 PROCEDURE — 84702 CHORIONIC GONADOTROPIN TEST: CPT | Performed by: SURGERY

## 2017-10-13 PROCEDURE — 87798 DETECT AGENT NOS DNA AMP: CPT | Performed by: SURGERY

## 2017-10-13 PROCEDURE — 82565 ASSAY OF CREATININE: CPT | Performed by: SURGERY

## 2017-10-13 PROCEDURE — 81001 URINALYSIS AUTO W/SCOPE: CPT | Performed by: SURGERY

## 2017-10-13 PROCEDURE — 87340 HEPATITIS B SURFACE AG IA: CPT | Performed by: SURGERY

## 2017-10-13 PROCEDURE — 87522 HEPATITIS C REVRS TRNSCRPJ: CPT | Performed by: SURGERY

## 2017-10-13 PROCEDURE — 87516 HEPATITIS B DNA AMP PROBE: CPT | Performed by: SURGERY

## 2017-10-13 PROCEDURE — 40000866 ZZHCL STATISTIC HIV 1/2 ANTIGEN/ANTIBODY PRETRANSPLANT ONLY: Performed by: SURGERY

## 2017-10-13 PROCEDURE — 86850 RBC ANTIBODY SCREEN: CPT | Performed by: SURGERY

## 2017-10-13 PROCEDURE — 87535 HIV-1 PROBE&REVERSE TRNSCRPJ: CPT | Performed by: SURGERY

## 2017-10-13 ASSESSMENT — PAIN SCALES - GENERAL: PAINLEVEL: NO PAIN (0)

## 2017-10-13 NOTE — LETTER
10/13/2017      RE: Frances Najera   [facility] [address]  [facility] [address]       Transplant Surgery H&P                                                        HPI:                                                      Ms. Najera is a 41 year old female who comes to clinic today for preop prior to planned laparoscopic kidney donation surgery. The patient was previously reviewed by the living donor multidisciplinary selection committee and found to be medically and psychosocially appropriate for voluntary kidney donation. The patient denies any feelings of being pressured to proceed with kidney donation.  Health events since donor evaluation: None    Special considerations:   Constipation: no  PONV: no  Significant neck/back/joint concerns for lateral decubitus positioning?: No  Caodaism: No    MEDICAL HISTORY:                                                      Patient Active Problem List    Diagnosis Date Noted     Transplant donor evaluation 2017     Priority: Medium      Past Medical History:   Diagnosis Date     H/O rosacea      Past Surgical History:   Procedure Laterality Date      SECTION       No current outpatient prescriptions on file.     OTC products: None, except as noted above  Allergies   Allergen Reactions     Codeine Rash      Social History   Substance Use Topics     Smoking status: Never Smoker     Smokeless tobacco: Never Used     Alcohol use No     History   Drug Use No       REVIEW OF SYSTEMS:                                                    Constitutional, neuro, ENT, endocrine, pulmonary, cardiac, gastrointestinal, genitourinary, musculoskeletal, integument and psychiatric systems are negative, except as otherwise noted.      EXAM:                                                    There were no vitals taken for this visit.    GENERAL APPEARANCE: healthy, alert and no distress     EYES: EOMI, PERRL     HENT: ear canals and TM's normal and nose and mouth without ulcers or  lesions     NECK: no adenopathy, no asymmetry, masses, or scars and thyroid normal to palpation     RESP: lungs clear to auscultation - no rales, rhonchi or wheezes     CV: regular rates and rhythm, normal S1 S2, no S3 or S4 and no murmur, click or rub     ABDOMEN:  soft, nontender, no HSM or masses and bowel sounds normal     MS: extremities normal- no gross deformities noted, no evidence of inflammation in joints, FROM in all extremities.     SKIN: no suspicious lesions or rashes     NEURO: Normal strength and tone, sensory exam grossly normal, mentation intact and speech normal     PSYCH: mentation appears normal. and affect normal/bright     LYMPHATICS: No axillary, cervical, or supraclavicular nodes    DIAGNOSTICS:                                                    EKG: sinus rhythm, cannot rule out anterior infarct, no previous ECGs available     Chest XRay  Recent Labs   Lab Test  10/13/17   1153  08/04/17   0727   HGB  12.5  12.5   PLT   --   250   INR   --   1.00   NA   --   137   POTASSIUM   --   3.7   CR  0.63  0.74   A1C   --   5.3      Assessment:                                                    Healthy voluntary kidney donor    There have been no significant intercurrent medical problems or change of intent in proceeding with kidney donation as scheduled on 10/19/2017.    1. Labs reviewed and within normal limits: Yes  2. EKG (08/04/17): abnormal-see report  3. Paired Exchange case: No  4. ABO= A  5. Laterality: left  6. Outstanding issues: None    Plan:                                                      1. Consent: Done  2. Outstanding issues: None    Signed Electronically by: Javon Ferrari I, Nestor Oconnor M.D., M.S., did see and evaluate Ms. Najera as part of a shared visit.     I personally reviewed past medical and surgical history, vital signs, medications and labs, present and past medical history,and significant physical exam findings with the advanced practice provider.    My key findings  include:  Appropriate for living kidney donation.    Key management decisions made by me:  OK for surgery.    Nestor Oconnor M.D., M.S.  Date of Service (when I saw the patient): 10/13/17    Nestor Oconnor MD

## 2017-10-13 NOTE — MR AVS SNAPSHOT
After Visit Summary   10/13/2017    Frances Najera    MRN: [id number]           Patient Information     Date Of Birth          1975        Visit Information        Provider Department      10/13/2017 12:00 PM Nurse, [facility] Solid Organ Transplant        Today's Diagnoses     Transplant donor evaluation    -  1       Follow-ups after your visit        Your next 10 appointments already scheduled     Oct 13, 2017  1:45 PM CDT   (Arrive by 1:30 PM)   Day Minus Visit with Nestor Oconnor MD   [facility] Solid Organ Transplant ([facility])    [address] [place] [id number] [phone]            Oct 19, 2017   Procedure with Nestor Oconnor MD   [facility], [place], Same Day Surgery (--)    [address] [facility] [address] [phone]            Nov 07, 2017 10:20 AM CST   (Arrive by 10:05 AM)   Kidney Donor Post Op with Nestro Oconnor MD   [facility] Solid Organ Transplant ([facility])    [address] [place] [address] [phone]              Who to contact     If you have questions or need follow up information about today's clinic visit or your schedule please contact [facility] SOLID ORGAN TRANSPLANT directly at [phone].  Normal or non-critical lab and imaging results will be communicated to you by MyChart, letter or phone within 4 business days after the clinic has received the results. If you do not hear from us within 7 days, please contact the clinic through MyChart or phone. If you have a critical or abnormal lab result, we will notify you by phone as soon as possible.  Submit refill requests through Xoinka or call your pharmacy and they will forward the refill request to us. Please allow 3 business days for your refill to be completed.          Additional Information About Your Visit        Xoinka Information     Xoinka gives you secure access to your electronic health  "record. If you see a primary care provider, you can also send messages to your care team and make appointments. If you have questions, please call your primary care clinic.  If you do not have a primary care provider, please call [phone] and they will assist you.        Care EveryWhere ID     This is your Care EveryWhere ID. This could be used by other organizations to access your [place] medical records  [id number]        Your Vitals Were     Pulse Temperature Respirations Height Pulse Oximetry BMI (Body Mass Index)    75 97.6  F (36.4  C) (Oral) 18 1.651 m (5' 5\") 99% 30.77 kg/m2       Blood Pressure from Last 3 Encounters:   10/13/17 128/85   08/04/17 108/76   08/04/17 118/80    Weight from Last 3 Encounters:   10/13/17 83.9 kg (184 lb 14.4 oz)   08/04/17 83.3 kg (183 lb 11.2 oz)   08/04/17 83.3 kg (183 lb 11.2 oz)              Today, you had the following     No orders found for display       Primary Care Provider Office Phone # Fax #    [facility] [phone] [address] [facility] [address]        Equal Access to Services     LOPEZ LOW : Hadii laureano ku hadasho Soximenaali, waaxda luqadaha, qaybta kaalmada adeegyada, waxay linda guerrier. So [facility] [phone].    ATENCIÓN: Si habla español, tiene a delgado disposición servicios gratuitos de asistencia lingüística. Llame al [phone].    We comply with applicable federal civil rights laws and [place] laws. We do not discriminate on the basis of race, color, national origin, age, disability, sex, sexual orientation, or gender identity.            Thank you!     Thank you for choosing [facility] SOLID ORGAN TRANSPLANT  for your care. Our goal is always to provide you with excellent care. Hearing back from our patients is one way we can continue to improve our services. Please take a few minutes to complete the written survey that you may receive in the mail after your visit with us. Thank you!      "        Your Updated Medication List - Protect others around you: Learn how to safely use, store and throw away your medicines at www.disposemymeds.org.      Notice  As of 10/13/2017  1:23 PM    You have not been prescribed any medications.

## 2017-10-13 NOTE — MR AVS SNAPSHOT
After Visit Summary   10/13/2017    Frances Najera    MRN: [id number]           Patient Information     Date Of Birth          1975        Visit Information        Provider Department      10/13/2017 12:15 PM Chaparrita Chowdhury [facility] Solid Organ Transplant        Today's Diagnoses     Transplant donor evaluation    -  1       Follow-ups after your visit        Your next 10 appointments already scheduled     Oct 19, 2017   Procedure with Nestor Oconnor MD   [facility], [place], Same Day Surgery (--)    [address]  [facility] [address] [phone]            Nov 07, 2017 10:20 AM CST   (Arrive by 10:05 AM)   Kidney Donor Post Op with Nestor Oconnor MD   [facility] Solid Organ Transplant ([facility])    [address] [facility]  3rd Floor  [facility] [address] [phone]              Who to contact     If you have questions or need follow up information about today's clinic visit or your schedule please contact [facility] SOLID ORGAN TRANSPLANT directly at [phone].  Normal or non-critical lab and imaging results will be communicated to you by Tweekaboohart, letter or phone within 4 business days after the clinic has received the results. If you do not hear from us within 7 days, please contact the clinic through Jibet or phone. If you have a critical or abnormal lab result, we will notify you by phone as soon as possible.  Submit refill requests through Urbster or call your pharmacy and they will forward the refill request to us. Please allow 3 business days for your refill to be completed.          Additional Information About Your Visit        Tweekaboohart Information     Urbster gives you secure access to your electronic health record. If you see a primary care provider, you can also send messages to your care team and make appointments. If you have questions, please call your primary care clinic.  If you do not have a primary care provider, please call [phone] and  they will assist you.        Care EveryWhere ID     This is your Care EveryWhere ID. This could be used by other organizations to access your [place] medical records  [id number]         Blood Pressure from Last 3 Encounters:   10/13/17 128/85   08/04/17 108/76   08/04/17 118/80    Weight from Last 3 Encounters:   10/13/17 83.9 kg (184 lb 14.4 oz)   08/04/17 83.3 kg (183 lb 11.2 oz)   08/04/17 83.3 kg (183 lb 11.2 oz)              Today, you had the following     No orders found for display       Primary Care Provider Office Phone # Fax #    [facility] [phone] [address] [facility] [address]        Equal Access to Services     LOPEZ LOW : Hadii laureano raygozao Soberto, waaxda luqadaha, qaybta kaalmada adeegyada, colin guerrier. So [facility] [phone].    ATENCIÓN: Si habla español, tiene a delgado disposición servicios gratuitos de asistencia lingüística. Llame al [phone].    We comply with applicable federal civil rights laws and [place] laws. We do not discriminate on the basis of race, color, national origin, age, disability, sex, sexual orientation, or gender identity.            Thank you!     Thank you for choosing [facility] SOLID ORGAN TRANSPLANT  for your care. Our goal is always to provide you with excellent care. Hearing back from our patients is one way we can continue to improve our services. Please take a few minutes to complete the written survey that you may receive in the mail after your visit with us. Thank you!             Your Updated Medication List - Protect others around you: Learn how to safely use, store and throw away your medicines at www.disposemymeds.org.      Notice  As of 10/13/2017 11:59 PM    You have not been prescribed any medications.

## 2017-10-13 NOTE — PROGRESS NOTES
Transplant Surgery H&P                                                        HPI:                                                      Ms. Najera is a 41 year old female who comes to clinic today for preop prior to planned laparoscopic kidney donation surgery. The patient was previously reviewed by the living donor multidisciplinary selection committee and found to be medically and psychosocially appropriate for voluntary kidney donation. The patient denies any feelings of being pressured to proceed with kidney donation.  Health events since donor evaluation: None    Special considerations:   Constipation: no  PONV: no  Significant neck/back/joint concerns for lateral decubitus positioning?: No  Pentecostal: No    MEDICAL HISTORY:                                                      Patient Active Problem List    Diagnosis Date Noted     Transplant donor evaluation 2017     Priority: Medium      Past Medical History:   Diagnosis Date     H/O rosacea      Past Surgical History:   Procedure Laterality Date      SECTION       No current outpatient prescriptions on file.     OTC products: None, except as noted above  Allergies   Allergen Reactions     Codeine Rash      Social History   Substance Use Topics     Smoking status: Never Smoker     Smokeless tobacco: Never Used     Alcohol use No     History   Drug Use No       REVIEW OF SYSTEMS:                                                    Constitutional, neuro, ENT, endocrine, pulmonary, cardiac, gastrointestinal, genitourinary, musculoskeletal, integument and psychiatric systems are negative, except as otherwise noted.      EXAM:                                                    There were no vitals taken for this visit.    GENERAL APPEARANCE: healthy, alert and no distress     EYES: EOMI, PERRL     HENT: ear canals and TM's normal and nose and mouth without ulcers or lesions     NECK: no adenopathy, no asymmetry, masses, or scars and thyroid  normal to palpation     RESP: lungs clear to auscultation - no rales, rhonchi or wheezes     CV: regular rates and rhythm, normal S1 S2, no S3 or S4 and no murmur, click or rub     ABDOMEN:  soft, nontender, no HSM or masses and bowel sounds normal     MS: extremities normal- no gross deformities noted, no evidence of inflammation in joints, FROM in all extremities.     SKIN: no suspicious lesions or rashes     NEURO: Normal strength and tone, sensory exam grossly normal, mentation intact and speech normal     PSYCH: mentation appears normal. and affect normal/bright     LYMPHATICS: No axillary, cervical, or supraclavicular nodes    DIAGNOSTICS:                                                    EKG: sinus rhythm, cannot rule out anterior infarct, no previous ECGs available     Chest XRay  Recent Labs   Lab Test  10/13/17   1153  08/04/17   0727   HGB  12.5  12.5   PLT   --   250   INR   --   1.00   NA   --   137   POTASSIUM   --   3.7   CR  0.63  0.74   A1C   --   5.3      Assessment:                                                    Healthy voluntary kidney donor    There have been no significant intercurrent medical problems or change of intent in proceeding with kidney donation as scheduled on 10/19/2017.    1. Labs reviewed and within normal limits: Yes  2. EKG (08/04/17): abnormal-see report  3. Paired Exchange case: No  4. ABO= A  5. Laterality: left  6. Outstanding issues: None    Plan:                                                      1. Consent: Done  2. Outstanding issues: None    Signed Electronically by: Nestor Rosales M.D., M.S., did see and evaluate Ms. Najera as part of a shared visit.     I personally reviewed past medical and surgical history, vital signs, medications and labs, present and past medical history,and significant physical exam findings with the advanced practice provider.    My key findings include:  Appropriate for living kidney donation.    Key management  decisions made by me:  OK for surgery.    Nestor Oconnor M.D., M.S.  Date of Service (when I saw the patient): 10/13/17

## 2017-10-13 NOTE — MR AVS SNAPSHOT
After Visit Summary   10/13/2017    Frances Najera    MRN: [id number]           Patient Information     Date Of Birth          1975        Visit Information        Provider Department      10/13/2017 12:30 PM Yenni Garrett PA-C [facility] Solid Organ Transplant        Today's Diagnoses     ERRONEOUS ENCOUNTER--DISREGARD    -  1       Follow-ups after your visit        Who to contact     If you have questions or need follow up information about today's clinic visit or your schedule please contact [facility] SOLID ORGAN TRANSPLANT directly at [phone].  Normal or non-critical lab and imaging results will be communicated to you by Serverside Grouphart, letter or phone within 4 business days after the clinic has received the results. If you do not hear from us within 7 days, please contact the clinic through AssertIDt or phone. If you have a critical or abnormal lab result, we will notify you by phone as soon as possible.  Submit refill requests through [employer] Connect [employer] or call your pharmacy and they will forward the refill request to us. Please allow 3 business days for your refill to be completed.          Additional Information About Your Visit        MyChart Information     [employer] gives you secure access to your electronic health record. If you see a primary care provider, you can also send messages to your care team and make appointments. If you have questions, please call your primary care clinic.  If you do not have a primary care provider, please call [phone] and they will assist you.        Care EveryWhere ID     This is your Care EveryWhere ID. This could be used by other organizations to access your [place] medical records  [id number]         Blood Pressure from Last 3 Encounters:   No data found for BP    Weight from Last 3 Encounters:   No data found for Wt              Today, you had the following     No orders found for display       Primary Care Provider Office Phone # Fax #    [place]  [facility] [place] [phone]                Equal Access to Services     LOPEZ OVERTONGLADIS : Hadii laureano yates jaretedison Soberto, waaxda luqadaha, qaybta kagato ramanlakeshaluis, colin mckeon yasmineanoop camargoyandel aiken jasbir . So [facility] [phone].    ATENCIÓN: Si habla español, tiene a delgado disposición servicios gratuitos de asistencia lingüística. Llame al [phone].    We comply with applicable federal civil rights laws and [place] laws. We do not discriminate on the basis of race, color, national origin, age, disability, sex, sexual orientation, or gender identity.            Thank you!     Thank you for choosing [facility] SOLID ORGAN TRANSPLANT  for your care. Our goal is always to provide you with excellent care. Hearing back from our patients is one way we can continue to improve our services. Please take a few minutes to complete the written survey that you may receive in the mail after your visit with us. Thank you!             Your Updated Medication List - Protect others around you: Learn how to safely use, store and throw away your medicines at www.disposemymeds.org.          This list is accurate as of: 10/13/17 11:59 PM.  Always use your most recent med list.                   Brand Name Dispense Instructions for use Diagnosis    acetaminophen 325 MG tablet    TYLENOL    100 tablet    Take 2 tablets (650 mg) by mouth every 4 hours as needed for mild pain    Kidney donor       HYDROmorphone 2 MG tablet    DILAUDID    40 tablet    Take 1-2 tablets (2-4 mg) by mouth every 4 hours as needed for moderate to severe pain    Kidney donor       ondansetron 4 MG ODT tab    ZOFRAN-ODT    30 tablet    Take 1 tablet (4 mg) by mouth every 6 hours as needed for nausea or vomiting    Kidney donor       senna-docusate 8.6-50 MG per tablet    SENOKOT-S;PERICOLACE    60 tablet    Take 1-2 tablets by mouth 2 times daily as needed for constipation    Kidney donor

## 2017-10-13 NOTE — Clinical Note
10/13/2017       RE: Frances Najera   [facility] [address]  [facility] [address]     Dear Colleague,    Thank you for referring your patient, Frances Najera, to the [facility] SOLID ORGAN TRANSPLANT at [facility]. Please see a copy of my visit note below.    Transplant Surgery H&P                                                        HPI:                                                      Ms. Najera is a 41 year old female who comes to clinic today for preop prior to planned laparoscopic kidney donation surgery. The patient was previously reviewed by the living donor multidisciplinary selection committee and found to be medically and psychosocially appropriate for voluntary kidney donation. The patient denies any feelings of being pressured to proceed with kidney donation.  Health events since donor evaluation: None    Special considerations:   Constipation: no  PONV: no  Significant neck/back/joint concerns for lateral decubitus positioning?: No  Tenriism: No    MEDICAL HISTORY:                                                      Patient Active Problem List    Diagnosis Date Noted     Transplant donor evaluation 2017     Priority: Medium      Past Medical History:   Diagnosis Date     H/O rosacea      Past Surgical History:   Procedure Laterality Date      SECTION       No current outpatient prescriptions on file.     OTC products: None, except as noted above  Allergies   Allergen Reactions     Codeine Rash      Social History   Substance Use Topics     Smoking status: Never Smoker     Smokeless tobacco: Never Used     Alcohol use No     History   Drug Use No       REVIEW OF SYSTEMS:                                                    Constitutional, neuro, ENT, endocrine, pulmonary, cardiac, gastrointestinal, genitourinary, musculoskeletal, integument and psychiatric systems are negative, except as otherwise noted.      EXAM:                                                     There were no vitals taken for this visit.    GENERAL APPEARANCE: healthy, alert and no distress     EYES: EOMI, PERRL     HENT: ear canals and TM's normal and nose and mouth without ulcers or lesions     NECK: no adenopathy, no asymmetry, masses, or scars and thyroid normal to palpation     RESP: lungs clear to auscultation - no rales, rhonchi or wheezes     CV: regular rates and rhythm, normal S1 S2, no S3 or S4 and no murmur, click or rub     ABDOMEN:  soft, nontender, no HSM or masses and bowel sounds normal     MS: extremities normal- no gross deformities noted, no evidence of inflammation in joints, FROM in all extremities.     SKIN: no suspicious lesions or rashes     NEURO: Normal strength and tone, sensory exam grossly normal, mentation intact and speech normal     PSYCH: mentation appears normal. and affect normal/bright     LYMPHATICS: No axillary, cervical, or supraclavicular nodes    DIAGNOSTICS:                                                    EKG: sinus rhythm, cannot rule out anterior infarct, no previous ECGs available   Serum Potassium  Serum Creatinine  Hemoglobin A1C  Coagulation Studies (e.g. INR, PTT, platelet count)  Chest XRay  Recent Labs   Lab Test  10/13/17   1153  08/04/17   0727   HGB  12.5  12.5   PLT   --   250   INR   --   1.00   NA   --   137   POTASSIUM   --   3.7   CR  0.63  0.74   A1C   --   5.3      Assessment:                                                    Healthy voluntary kidney donor    There have been no significant intercurrent medical problems or change of intent in proceeding with kidney donation as scheduled on 10/19/2017.    1. Labs reviewed and within normal limits: Yes  2. EKG (08/04/17): abnormal-see report  3. Paired Exchange case: Yes  4. ABO= PENDING  5. Laterality: left  6. Outstanding issues: None    Plan:                                                      1. Consent: Done  2. Outstanding issues: None    Signed Electronically by: Javon  Vega LEIJA, Nestor Oconnor M.D., M.S., did see and evaluate Ms. Najera as part of a shared visit.     I personally reviewed past medical and surgical history, vital signs, medications and labs, present and past medical history,and significant physical exam findings with the advanced practice provider.    My key findings include:  ***.    Key management decisions made by me:  ***.    {TX FACULTY:850597}  Date of Service (when I saw the patient): {Date of Service:114649}    Again, thank you for allowing me to participate in the care of your patient.      Sincerely,    Nestor Oconnor MD

## 2017-10-13 NOTE — Clinical Note
10/13/2017       RE: Frances Najera   [facility] [place] [place] [address]  [facility] [address]     Dear Colleague,    Thank you for referring your patient, Frances Najera, to the [facility]GAN TRANS[facility] at [facility]. Please see a copy of my visit note below.      This encounter was opened in error. Please disregard.    Again, thank you for allowing me to participate in the care of your patient.      Sincerely,    Yenni Garrett PA-C

## 2017-10-13 NOTE — MR AVS SNAPSHOT
After Visit Summary   10/13/2017    Frances Najera    MRN: [id number]           Patient Information     Date Of Birth          1975        Visit Information        Provider Department      10/13/2017 1:45 PM Nestor Oconnor MD [facility] Solid Organ Transplant        Today's Diagnoses     Transplant donor evaluation    -  1       Follow-ups after your visit        Your next 10 appointments already scheduled     Oct 19, 2017   Procedure with Nestor Oconnor MD   [facility], [place], Same Day Surgery (--)    [address]  [place] [id number] [phone]            Nov 07, 2017 10:20 AM CST   (Arrive by 10:05 AM)   Kidney Donor Post Op with Nestor Oconnor MD   [facility] Transplant ([facility] [place])    [address]  [address] [facility] [address] [phone]              Who to contact     If you have questions or need follow up information about today's clinic visit or your schedule please contact [facility] SOLID ORGAN TRANSPLANT directly at [phone].  Normal or non-critical lab and imaging results will be communicated to you by Yekrahart, letter or phone within 4 business days after the clinic has received the results. If you do not hear from us within 7 days, please contact the clinic through R2integratedt or phone. If you have a critical or abnormal lab result, we will notify you by phone as soon as possible.  Submit refill requests through Algolytics or call your pharmacy and they will forward the refill request to us. Please allow 3 business days for your refill to be completed.          Additional Information About Your Visit        Yekrahart Information     [employer] gives you secure access to your electronic health record. If you see a primary care provider, you can also send messages to your care team and make appointments. If you have questions, please call your primary care clinic.  If you do not have a primary care provider, please call [phone] and  they will assist you.        Care EveryWhere ID     This is your Care EveryWhere ID. This could be used by other organizations to access your [facility] medical records  [id number]         Blood Pressure from Last 3 Encounters:   10/13/17 128/85   08/04/17 108/76   08/04/17 118/80    Weight from Last 3 Encounters:   10/13/17 83.9 kg (184 lb 14.4 oz)   08/04/17 83.3 kg (183 lb 11.2 oz)   08/04/17 83.3 kg (183 lb 11.2 oz)              Today, you had the following     No orders found for display       Primary Care Provider Office Phone # Fax #    [facility] [phone] [address] [address]        Equal Access to Services     LOPEZ LOW : Hadii laureano raygozao Soberto, waaxda luqadaha, qaybta kaalmada adeegyada, colin guerrier. So [facility] [phone].    ATENCIÓN: Si habla español, tiene a delgado disposición servicios gratuitos de asistencia lingüística. Llame al [phone].    We comply with applicable federal civil rights laws and [place] laws. We do not discriminate on the basis of race, color, national origin, age, disability, sex, sexual orientation, or gender identity.            Thank you!     Thank you for choosing [facility] SOLID ORGAN TRANSPLANT  for your care. Our goal is always to provide you with excellent care. Hearing back from our patients is one way we can continue to improve our services. Please take a few minutes to complete the written survey that you may receive in the mail after your visit with us. Thank you!             Your Updated Medication List - Protect others around you: Learn how to safely use, store and throw away your medicines at www.disposemymeds.org.      Notice  As of 10/13/2017 11:59 PM    You have not been prescribed any medications.

## 2017-10-16 ENCOUNTER — DOCUMENTATION ONLY (OUTPATIENT)
Dept: TRANSPLANT | Facility: CLINIC | Age: 42
End: 2017-10-16

## 2017-10-16 LAB
HCV RNA SERPL NAA+PROBE-ACNC: NORMAL [IU]/ML
HCV RNA SERPL NAA+PROBE-LOG IU: NORMAL LOG IU/ML
HLA FINAL CROSSMATCH DONOR: NORMAL

## 2017-10-16 NOTE — NURSING NOTE
Saw Frances on Friday, October 13 for pre-op.  I reviewed expectations for day of surgery.  I answered all questions.  I gave Frances the donor blanket, parking passes and thank you note.  I thanked her for her donation.  I provided information regarding shuttle service [facility].

## 2017-10-17 ENCOUNTER — TELEPHONE (OUTPATIENT)
Dept: TRANSPLANT | Facility: CLINIC | Age: 42
End: 2017-10-17

## 2017-10-17 NOTE — TELEPHONE ENCOUNTER
Phone conversation with Frances today.  She is schedule for donor nephrectomy on 10/19.  She lives in [place], [place], and has been approved for [employer]LD[employer].  Her daughter will accompany her to the transplant center and stay locally while she is in the hospital.  Moncho is supportive but will stay home and continue to work while she is here.  She denies pressure, coercion or inducement.  Remains highly motivated.  No new psychosocial issues identified and donor /TITO will remain available to assist with advocacy needs and psychosocial needs, as they arise, both inpatient and outpatient.

## 2017-10-18 ENCOUNTER — ANESTHESIA EVENT (OUTPATIENT)
Dept: SURGERY | Facility: CLINIC | Age: 42
DRG: 661 | End: 2017-10-18

## 2017-10-18 LAB
MPX SERIES: NONREACTIVE
WNV RNA SPEC QL NAA+PROBE: NONREACTIVE

## 2017-10-19 ENCOUNTER — SURGERY (OUTPATIENT)
Age: 42
End: 2017-10-19

## 2017-10-19 ENCOUNTER — ANESTHESIA (OUTPATIENT)
Dept: SURGERY | Facility: CLINIC | Age: 42
DRG: 661 | End: 2017-10-19

## 2017-10-19 ENCOUNTER — HOSPITAL ENCOUNTER (INPATIENT)
Facility: CLINIC | Age: 42
LOS: 2 days | Discharge: HOME | DRG: 661 | End: 2017-10-21
Attending: SURGERY | Admitting: SURGERY
Payer: MEDICAID

## 2017-10-19 DIAGNOSIS — Z52.4 KIDNEY DONOR: Primary | ICD-10-CM

## 2017-10-19 LAB
ABO + RH BLD: NORMAL
ABO + RH BLD: NORMAL
BLD GP AB SCN SERPL QL: NORMAL
BLOOD BANK CMNT PATIENT-IMP: NORMAL
BLOOD BANK CMNT PATIENT-IMP: NORMAL
GLUCOSE BLDC GLUCOMTR-MCNC: 98 MG/DL (ref 70–99)
HCG UR QL: NEGATIVE
HCT VFR BLD AUTO: 30.9 % (ref 35–47)
HGB BLD-MCNC: 10 G/DL (ref 11.7–15.7)
SPECIMEN EXP DATE BLD: NORMAL

## 2017-10-19 PROCEDURE — 85014 HEMATOCRIT: CPT | Performed by: STUDENT IN AN ORGANIZED HEALTH CARE EDUCATION/TRAINING PROGRAM

## 2017-10-19 PROCEDURE — 40000171 ZZH STATISTIC PRE-PROCEDURE ASSESSMENT III: Performed by: SURGERY

## 2017-10-19 PROCEDURE — 0TT14ZZ RESECTION OF LEFT KIDNEY, PERCUTANEOUS ENDOSCOPIC APPROACH: ICD-10-PCS | Performed by: SURGERY

## 2017-10-19 PROCEDURE — 37000008 ZZH ANESTHESIA TECHNICAL FEE, 1ST 30 MIN: Performed by: SURGERY

## 2017-10-19 PROCEDURE — 25000128 H RX IP 250 OP 636: Performed by: NURSE ANESTHETIST, CERTIFIED REGISTERED

## 2017-10-19 PROCEDURE — 85018 HEMOGLOBIN: CPT | Performed by: STUDENT IN AN ORGANIZED HEALTH CARE EDUCATION/TRAINING PROGRAM

## 2017-10-19 PROCEDURE — 25000566 ZZH SEVOFLURANE, EA 15 MIN: Performed by: SURGERY

## 2017-10-19 PROCEDURE — 25000128 H RX IP 250 OP 636: Performed by: STUDENT IN AN ORGANIZED HEALTH CARE EDUCATION/TRAINING PROGRAM

## 2017-10-19 PROCEDURE — 25000128 H RX IP 250 OP 636: Performed by: ANESTHESIOLOGY

## 2017-10-19 PROCEDURE — 36000062 ZZH SURGERY LEVEL 4 1ST 30 MIN - UMMC: Performed by: SURGERY

## 2017-10-19 PROCEDURE — 71000014 ZZH RECOVERY PHASE 1 LEVEL 2 FIRST HR: Performed by: SURGERY

## 2017-10-19 PROCEDURE — 25000132 ZZH RX MED GY IP 250 OP 250 PS 637: Performed by: STUDENT IN AN ORGANIZED HEALTH CARE EDUCATION/TRAINING PROGRAM

## 2017-10-19 PROCEDURE — C9399 UNCLASSIFIED DRUGS OR BIOLOG: HCPCS | Performed by: NURSE ANESTHETIST, CERTIFIED REGISTERED

## 2017-10-19 PROCEDURE — 25000125 ZZHC RX 250: Performed by: NURSE ANESTHETIST, CERTIFIED REGISTERED

## 2017-10-19 PROCEDURE — 27210794 ZZH OR GENERAL SUPPLY STERILE: Performed by: SURGERY

## 2017-10-19 PROCEDURE — 00000146 ZZHCL STATISTIC GLUCOSE BY METER IP

## 2017-10-19 PROCEDURE — S0020 INJECTION, BUPIVICAINE HYDRO: HCPCS | Performed by: ANESTHESIOLOGY

## 2017-10-19 PROCEDURE — 25000125 ZZHC RX 250: Performed by: STUDENT IN AN ORGANIZED HEALTH CARE EDUCATION/TRAINING PROGRAM

## 2017-10-19 PROCEDURE — 71000015 ZZH RECOVERY PHASE 1 LEVEL 2 EA ADDTL HR: Performed by: SURGERY

## 2017-10-19 PROCEDURE — 12000025 ZZH R&B TRANSPLANT INTERMEDIATE

## 2017-10-19 PROCEDURE — 25000125 ZZHC RX 250: Performed by: ANESTHESIOLOGY

## 2017-10-19 PROCEDURE — C9290 INJ, BUPIVACAINE LIPOSOME: HCPCS | Performed by: STUDENT IN AN ORGANIZED HEALTH CARE EDUCATION/TRAINING PROGRAM

## 2017-10-19 PROCEDURE — 36000064 ZZH SURGERY LEVEL 4 EA 15 ADDTL MIN - UMMC: Performed by: SURGERY

## 2017-10-19 PROCEDURE — 81025 URINE PREGNANCY TEST: CPT | Performed by: ANESTHESIOLOGY

## 2017-10-19 PROCEDURE — 37000009 ZZH ANESTHESIA TECHNICAL FEE, EACH ADDTL 15 MIN: Performed by: SURGERY

## 2017-10-19 PROCEDURE — 36415 COLL VENOUS BLD VENIPUNCTURE: CPT | Performed by: STUDENT IN AN ORGANIZED HEALTH CARE EDUCATION/TRAINING PROGRAM

## 2017-10-19 RX ORDER — BUPIVACAINE HYDROCHLORIDE 2.5 MG/ML
INJECTION, SOLUTION EPIDURAL; INFILTRATION; INTRACAUDAL PRN
Status: DISCONTINUED | OUTPATIENT
Start: 2017-10-19 | End: 2017-10-19

## 2017-10-19 RX ORDER — ONDANSETRON 4 MG/1
4 TABLET, ORALLY DISINTEGRATING ORAL EVERY 6 HOURS PRN
Status: DISCONTINUED | OUTPATIENT
Start: 2017-10-19 | End: 2017-10-21 | Stop reason: HOSPADM

## 2017-10-19 RX ORDER — FLUMAZENIL 0.1 MG/ML
0.2 INJECTION, SOLUTION INTRAVENOUS
Status: DISCONTINUED | OUTPATIENT
Start: 2017-10-19 | End: 2017-10-19 | Stop reason: HOSPADM

## 2017-10-19 RX ORDER — NALOXONE HYDROCHLORIDE 0.4 MG/ML
.1-.4 INJECTION, SOLUTION INTRAMUSCULAR; INTRAVENOUS; SUBCUTANEOUS
Status: DISCONTINUED | OUTPATIENT
Start: 2017-10-19 | End: 2017-10-19 | Stop reason: HOSPADM

## 2017-10-19 RX ORDER — PROPOFOL 10 MG/ML
INJECTION, EMULSION INTRAVENOUS PRN
Status: DISCONTINUED | OUTPATIENT
Start: 2017-10-19 | End: 2017-10-19

## 2017-10-19 RX ORDER — HEPARIN SODIUM 1000 [USP'U]/ML
INJECTION, SOLUTION INTRAVENOUS; SUBCUTANEOUS PRN
Status: DISCONTINUED | OUTPATIENT
Start: 2017-10-19 | End: 2017-10-19

## 2017-10-19 RX ORDER — SODIUM CHLORIDE, SODIUM LACTATE, POTASSIUM CHLORIDE, CALCIUM CHLORIDE 600; 310; 30; 20 MG/100ML; MG/100ML; MG/100ML; MG/100ML
INJECTION, SOLUTION INTRAVENOUS CONTINUOUS
Status: DISCONTINUED | OUTPATIENT
Start: 2017-10-19 | End: 2017-10-19 | Stop reason: HOSPADM

## 2017-10-19 RX ORDER — ACETAMINOPHEN 325 MG/1
975 TABLET ORAL EVERY 8 HOURS
Status: DISCONTINUED | OUTPATIENT
Start: 2017-10-19 | End: 2017-10-21 | Stop reason: HOSPADM

## 2017-10-19 RX ORDER — DEXTROSE, SODIUM CHLORIDE, SODIUM LACTATE, POTASSIUM CHLORIDE, AND CALCIUM CHLORIDE 5; .6; .31; .03; .02 G/100ML; G/100ML; G/100ML; G/100ML; G/100ML
INJECTION, SOLUTION INTRAVENOUS CONTINUOUS
Status: DISCONTINUED | OUTPATIENT
Start: 2017-10-19 | End: 2017-10-20

## 2017-10-19 RX ORDER — NALOXONE HYDROCHLORIDE 0.4 MG/ML
.1-.4 INJECTION, SOLUTION INTRAMUSCULAR; INTRAVENOUS; SUBCUTANEOUS
Status: DISCONTINUED | OUTPATIENT
Start: 2017-10-19 | End: 2017-10-21 | Stop reason: HOSPADM

## 2017-10-19 RX ORDER — ONDANSETRON 4 MG/1
4 TABLET, ORALLY DISINTEGRATING ORAL EVERY 30 MIN PRN
Status: DISCONTINUED | OUTPATIENT
Start: 2017-10-19 | End: 2017-10-19 | Stop reason: HOSPADM

## 2017-10-19 RX ORDER — AMOXICILLIN 250 MG
1-2 CAPSULE ORAL 2 TIMES DAILY
Status: DISCONTINUED | OUTPATIENT
Start: 2017-10-19 | End: 2017-10-21 | Stop reason: HOSPADM

## 2017-10-19 RX ORDER — HYDROMORPHONE HYDROCHLORIDE 1 MG/ML
.3-.5 INJECTION, SOLUTION INTRAMUSCULAR; INTRAVENOUS; SUBCUTANEOUS EVERY 5 MIN PRN
Status: DISCONTINUED | OUTPATIENT
Start: 2017-10-19 | End: 2017-10-19 | Stop reason: HOSPADM

## 2017-10-19 RX ORDER — PROPOFOL 10 MG/ML
INJECTION, EMULSION INTRAVENOUS CONTINUOUS PRN
Status: DISCONTINUED | OUTPATIENT
Start: 2017-10-19 | End: 2017-10-19

## 2017-10-19 RX ORDER — DEXAMETHASONE SODIUM PHOSPHATE 4 MG/ML
INJECTION, SOLUTION INTRA-ARTICULAR; INTRALESIONAL; INTRAMUSCULAR; INTRAVENOUS; SOFT TISSUE PRN
Status: DISCONTINUED | OUTPATIENT
Start: 2017-10-19 | End: 2017-10-19

## 2017-10-19 RX ORDER — FENTANYL CITRATE 50 UG/ML
INJECTION, SOLUTION INTRAMUSCULAR; INTRAVENOUS PRN
Status: DISCONTINUED | OUTPATIENT
Start: 2017-10-19 | End: 2017-10-19

## 2017-10-19 RX ORDER — ONDANSETRON 2 MG/ML
INJECTION INTRAMUSCULAR; INTRAVENOUS PRN
Status: DISCONTINUED | OUTPATIENT
Start: 2017-10-19 | End: 2017-10-19

## 2017-10-19 RX ORDER — HYDROMORPHONE HCL/0.9% NACL/PF 0.2MG/0.2
0.2 SYRINGE (ML) INTRAVENOUS
Status: DISCONTINUED | OUTPATIENT
Start: 2017-10-19 | End: 2017-10-21 | Stop reason: HOSPADM

## 2017-10-19 RX ORDER — HYDROMORPHONE HYDROCHLORIDE 2 MG/1
2-4 TABLET ORAL
Status: DISCONTINUED | OUTPATIENT
Start: 2017-10-19 | End: 2017-10-21 | Stop reason: HOSPADM

## 2017-10-19 RX ORDER — ONDANSETRON 2 MG/ML
4 INJECTION INTRAMUSCULAR; INTRAVENOUS EVERY 30 MIN PRN
Status: DISCONTINUED | OUTPATIENT
Start: 2017-10-19 | End: 2017-10-19 | Stop reason: HOSPADM

## 2017-10-19 RX ORDER — LABETALOL HYDROCHLORIDE 5 MG/ML
10 INJECTION, SOLUTION INTRAVENOUS
Status: DISCONTINUED | OUTPATIENT
Start: 2017-10-19 | End: 2017-10-19 | Stop reason: HOSPADM

## 2017-10-19 RX ORDER — SODIUM CHLORIDE, SODIUM LACTATE, POTASSIUM CHLORIDE, CALCIUM CHLORIDE 600; 310; 30; 20 MG/100ML; MG/100ML; MG/100ML; MG/100ML
500 INJECTION, SOLUTION INTRAVENOUS CONTINUOUS
Status: DISCONTINUED | OUTPATIENT
Start: 2017-10-19 | End: 2017-10-19 | Stop reason: HOSPADM

## 2017-10-19 RX ORDER — FUROSEMIDE 10 MG/ML
INJECTION INTRAMUSCULAR; INTRAVENOUS PRN
Status: DISCONTINUED | OUTPATIENT
Start: 2017-10-19 | End: 2017-10-19

## 2017-10-19 RX ORDER — SODIUM CHLORIDE, SODIUM LACTATE, POTASSIUM CHLORIDE, CALCIUM CHLORIDE 600; 310; 30; 20 MG/100ML; MG/100ML; MG/100ML; MG/100ML
INJECTION, SOLUTION INTRAVENOUS CONTINUOUS PRN
Status: DISCONTINUED | OUTPATIENT
Start: 2017-10-19 | End: 2017-10-19

## 2017-10-19 RX ORDER — KETOROLAC TROMETHAMINE 30 MG/ML
30 INJECTION, SOLUTION INTRAMUSCULAR; INTRAVENOUS EVERY 6 HOURS
Status: DISCONTINUED | OUTPATIENT
Start: 2017-10-19 | End: 2017-10-19

## 2017-10-19 RX ORDER — ONDANSETRON 2 MG/ML
4 INJECTION INTRAMUSCULAR; INTRAVENOUS EVERY 6 HOURS PRN
Status: DISCONTINUED | OUTPATIENT
Start: 2017-10-19 | End: 2017-10-21 | Stop reason: HOSPADM

## 2017-10-19 RX ORDER — PROTAMINE SULFATE 10 MG/ML
INJECTION, SOLUTION INTRAVENOUS PRN
Status: DISCONTINUED | OUTPATIENT
Start: 2017-10-19 | End: 2017-10-19

## 2017-10-19 RX ORDER — LIDOCAINE 40 MG/G
CREAM TOPICAL
Status: DISCONTINUED | OUTPATIENT
Start: 2017-10-19 | End: 2017-10-19 | Stop reason: HOSPADM

## 2017-10-19 RX ORDER — MANNITOL 20 G/100ML
INJECTION, SOLUTION INTRAVENOUS PRN
Status: DISCONTINUED | OUTPATIENT
Start: 2017-10-19 | End: 2017-10-19

## 2017-10-19 RX ORDER — FENTANYL CITRATE 50 UG/ML
25-50 INJECTION, SOLUTION INTRAMUSCULAR; INTRAVENOUS
Status: DISCONTINUED | OUTPATIENT
Start: 2017-10-19 | End: 2017-10-19 | Stop reason: HOSPADM

## 2017-10-19 RX ORDER — KETOROLAC TROMETHAMINE 30 MG/ML
30 INJECTION, SOLUTION INTRAMUSCULAR; INTRAVENOUS EVERY 6 HOURS
Status: COMPLETED | OUTPATIENT
Start: 2017-10-20 | End: 2017-10-20

## 2017-10-19 RX ADMIN — SODIUM CHLORIDE, SODIUM LACTATE, POTASSIUM CHLORIDE, CALCIUM CHLORIDE AND DEXTROSE MONOHYDRATE: 5; 600; 310; 30; 20 INJECTION, SOLUTION INTRAVENOUS at 12:41

## 2017-10-19 RX ADMIN — FENTANYL CITRATE 50 MCG: 50 INJECTION, SOLUTION INTRAMUSCULAR; INTRAVENOUS at 06:52

## 2017-10-19 RX ADMIN — HYDROMORPHONE HYDROCHLORIDE 0.2 MG: 1 INJECTION, SOLUTION INTRAMUSCULAR; INTRAVENOUS; SUBCUTANEOUS at 11:55

## 2017-10-19 RX ADMIN — MANNITOL 12.5 G: 20 INJECTION, SOLUTION INTRAVENOUS at 10:04

## 2017-10-19 RX ADMIN — FENTANYL CITRATE 25 MCG: 50 INJECTION, SOLUTION INTRAMUSCULAR; INTRAVENOUS at 14:08

## 2017-10-19 RX ADMIN — MIDAZOLAM HYDROCHLORIDE 2 MG: 1 INJECTION, SOLUTION INTRAMUSCULAR; INTRAVENOUS at 07:16

## 2017-10-19 RX ADMIN — PROPOFOL 100 MG: 10 INJECTION, EMULSION INTRAVENOUS at 07:32

## 2017-10-19 RX ADMIN — FENTANYL CITRATE 25 MCG: 50 INJECTION, SOLUTION INTRAMUSCULAR; INTRAVENOUS at 13:27

## 2017-10-19 RX ADMIN — ONDANSETRON 4 MG: 4 TABLET, ORALLY DISINTEGRATING ORAL at 20:35

## 2017-10-19 RX ADMIN — FENTANYL CITRATE 50 MCG: 50 INJECTION, SOLUTION INTRAMUSCULAR; INTRAVENOUS at 08:48

## 2017-10-19 RX ADMIN — FUROSEMIDE 10 MG: 10 INJECTION, SOLUTION INTRAVENOUS at 10:03

## 2017-10-19 RX ADMIN — FENTANYL CITRATE 25 MCG: 50 INJECTION, SOLUTION INTRAMUSCULAR; INTRAVENOUS at 14:15

## 2017-10-19 RX ADMIN — ACETAMINOPHEN 975 MG: 325 TABLET, FILM COATED ORAL at 23:46

## 2017-10-19 RX ADMIN — ROCURONIUM BROMIDE 50 MG: 10 INJECTION INTRAVENOUS at 07:32

## 2017-10-19 RX ADMIN — FENTANYL CITRATE 50 MCG: 50 INJECTION, SOLUTION INTRAMUSCULAR; INTRAVENOUS at 08:13

## 2017-10-19 RX ADMIN — FENTANYL CITRATE 50 MCG: 50 INJECTION, SOLUTION INTRAMUSCULAR; INTRAVENOUS at 07:32

## 2017-10-19 RX ADMIN — Medication 0.2 MG: at 20:35

## 2017-10-19 RX ADMIN — SODIUM CHLORIDE, POTASSIUM CHLORIDE, SODIUM LACTATE AND CALCIUM CHLORIDE: 600; 310; 30; 20 INJECTION, SOLUTION INTRAVENOUS at 08:05

## 2017-10-19 RX ADMIN — HYDROMORPHONE HYDROCHLORIDE 0.3 MG: 1 INJECTION, SOLUTION INTRAMUSCULAR; INTRAVENOUS; SUBCUTANEOUS at 13:48

## 2017-10-19 RX ADMIN — MIDAZOLAM 1 MG: 1 INJECTION INTRAMUSCULAR; INTRAVENOUS at 06:53

## 2017-10-19 RX ADMIN — HYDROMORPHONE HYDROCHLORIDE 0.2 MG: 1 INJECTION, SOLUTION INTRAMUSCULAR; INTRAVENOUS; SUBCUTANEOUS at 13:55

## 2017-10-19 RX ADMIN — HYDROMORPHONE HYDROCHLORIDE 0.2 MG: 1 INJECTION, SOLUTION INTRAMUSCULAR; INTRAVENOUS; SUBCUTANEOUS at 14:11

## 2017-10-19 RX ADMIN — ROCURONIUM BROMIDE 10 MG: 10 INJECTION INTRAVENOUS at 10:29

## 2017-10-19 RX ADMIN — CEFUROXIME 1.5 G: 1.5 INJECTION, POWDER, FOR SOLUTION INTRAVENOUS at 08:10

## 2017-10-19 RX ADMIN — PROTAMINE SULFATE 30 MG: 10 INJECTION, SOLUTION INTRAVENOUS at 11:05

## 2017-10-19 RX ADMIN — FENTANYL CITRATE 50 MCG: 50 INJECTION, SOLUTION INTRAMUSCULAR; INTRAVENOUS at 09:54

## 2017-10-19 RX ADMIN — DEXAMETHASONE SODIUM PHOSPHATE 4 MG: 4 INJECTION, SOLUTION INTRA-ARTICULAR; INTRALESIONAL; INTRAMUSCULAR; INTRAVENOUS; SOFT TISSUE at 08:22

## 2017-10-19 RX ADMIN — PROCHLORPERAZINE EDISYLATE 5 MG: 5 INJECTION INTRAMUSCULAR; INTRAVENOUS at 13:58

## 2017-10-19 RX ADMIN — FUROSEMIDE 5 MG: 10 INJECTION, SOLUTION INTRAVENOUS at 11:00

## 2017-10-19 RX ADMIN — FENTANYL CITRATE 25 MCG: 50 INJECTION, SOLUTION INTRAMUSCULAR; INTRAVENOUS at 13:31

## 2017-10-19 RX ADMIN — SUGAMMADEX 200 MG: 100 INJECTION, SOLUTION INTRAVENOUS at 12:09

## 2017-10-19 RX ADMIN — FENTANYL CITRATE 25 MCG: 50 INJECTION, SOLUTION INTRAMUSCULAR; INTRAVENOUS at 13:43

## 2017-10-19 RX ADMIN — FENTANYL CITRATE 50 MCG: 50 INJECTION, SOLUTION INTRAMUSCULAR; INTRAVENOUS at 11:06

## 2017-10-19 RX ADMIN — ROCURONIUM BROMIDE 20 MG: 10 INJECTION INTRAVENOUS at 09:42

## 2017-10-19 RX ADMIN — ROCURONIUM BROMIDE 20 MG: 10 INJECTION INTRAVENOUS at 08:24

## 2017-10-19 RX ADMIN — HYDROMORPHONE HYDROCHLORIDE 0.5 MG: 1 INJECTION, SOLUTION INTRAMUSCULAR; INTRAVENOUS; SUBCUTANEOUS at 12:11

## 2017-10-19 RX ADMIN — SODIUM CHLORIDE, POTASSIUM CHLORIDE, SODIUM LACTATE AND CALCIUM CHLORIDE: 600; 310; 30; 20 INJECTION, SOLUTION INTRAVENOUS at 09:29

## 2017-10-19 RX ADMIN — CEFUROXIME 1.5 G: 1.5 INJECTION, POWDER, FOR SOLUTION INTRAVENOUS at 10:10

## 2017-10-19 RX ADMIN — KETOROLAC TROMETHAMINE 30 MG: 30 INJECTION, SOLUTION INTRAMUSCULAR at 13:57

## 2017-10-19 RX ADMIN — FENTANYL CITRATE 25 MCG: 50 INJECTION, SOLUTION INTRAMUSCULAR; INTRAVENOUS at 13:51

## 2017-10-19 RX ADMIN — BUPIVACAINE HYDROCHLORIDE 20 ML: 2.5 INJECTION, SOLUTION EPIDURAL; INFILTRATION; INTRACAUDAL at 06:51

## 2017-10-19 RX ADMIN — FENTANYL CITRATE 100 MCG: 50 INJECTION, SOLUTION INTRAMUSCULAR; INTRAVENOUS at 08:27

## 2017-10-19 RX ADMIN — FENTANYL CITRATE 25 MCG: 50 INJECTION, SOLUTION INTRAMUSCULAR; INTRAVENOUS at 13:37

## 2017-10-19 RX ADMIN — FENTANYL CITRATE 25 MCG: 50 INJECTION, SOLUTION INTRAMUSCULAR; INTRAVENOUS at 14:19

## 2017-10-19 RX ADMIN — HYDROMORPHONE HYDROCHLORIDE 0.3 MG: 1 INJECTION, SOLUTION INTRAMUSCULAR; INTRAVENOUS; SUBCUTANEOUS at 11:53

## 2017-10-19 RX ADMIN — HYDROMORPHONE HYDROCHLORIDE 0.3 MG: 1 INJECTION, SOLUTION INTRAMUSCULAR; INTRAVENOUS; SUBCUTANEOUS at 14:06

## 2017-10-19 RX ADMIN — SODIUM CHLORIDE, POTASSIUM CHLORIDE, SODIUM LACTATE AND CALCIUM CHLORIDE: 600; 310; 30; 20 INJECTION, SOLUTION INTRAVENOUS at 07:16

## 2017-10-19 RX ADMIN — PROPOFOL 25 MCG/KG/MIN: 10 INJECTION, EMULSION INTRAVENOUS at 09:13

## 2017-10-19 RX ADMIN — ACETAMINOPHEN 975 MG: 325 TABLET, FILM COATED ORAL at 16:36

## 2017-10-19 RX ADMIN — ONDANSETRON 4 MG: 2 INJECTION INTRAMUSCULAR; INTRAVENOUS at 08:22

## 2017-10-19 RX ADMIN — HEPARIN SODIUM 3000 UNITS: 1000 INJECTION, SOLUTION INTRAVENOUS; SUBCUTANEOUS at 10:51

## 2017-10-19 RX ADMIN — ROCURONIUM BROMIDE 10 MG: 10 INJECTION INTRAVENOUS at 11:19

## 2017-10-19 RX ADMIN — SODIUM CHLORIDE, SODIUM LACTATE, POTASSIUM CHLORIDE, CALCIUM CHLORIDE AND DEXTROSE MONOHYDRATE: 5; 600; 310; 30; 20 INJECTION, SOLUTION INTRAVENOUS at 19:09

## 2017-10-19 RX ADMIN — BUPIVACAINE 20 ML: 13.3 INJECTION, SUSPENSION, LIPOSOMAL INFILTRATION at 06:51

## 2017-10-19 RX ADMIN — PROCHLORPERAZINE EDISYLATE 5 MG: 5 INJECTION INTRAMUSCULAR; INTRAVENOUS at 13:42

## 2017-10-19 RX ADMIN — ONDANSETRON 4 MG: 2 INJECTION INTRAMUSCULAR; INTRAVENOUS at 13:13

## 2017-10-19 ASSESSMENT — ENCOUNTER SYMPTOMS: SEIZURES: 0

## 2017-10-19 ASSESSMENT — PAIN DESCRIPTION - DESCRIPTORS: DESCRIPTORS: ACHING

## 2017-10-19 NOTE — ANESTHESIA PREPROCEDURE EVALUATION
Anesthesia Evaluation     . Pt has had prior anesthetic.     History of anesthetic complications   - PONV        ROS/MED HX    ENT/Pulmonary:  - neg pulmonary ROS     Neurologic:  - neg neurologic ROS    (-) seizures and CVA   Cardiovascular:  - neg cardiovascular ROS       METS/Exercise Tolerance:  >4 METS   Hematologic:         Musculoskeletal:         GI/Hepatic:  - neg GI/hepatic ROS      (-) GERD   Renal/Genitourinary:  - ROS Renal section negative    (-) renal disease   Endo:  - neg endo ROS       Psychiatric:         Infectious Disease:         Malignancy:         Other:                     Physical Exam  Normal systems: dental    Airway   Mallampati: II  TM distance: >3 FB  Neck ROM: full    Dental     Cardiovascular   Rhythm and rate: regular and normal      Pulmonary    breath sounds clear to auscultation                    Anesthesia Plan      History & Physical Review  History and physical reviewed and following examination; no interval change.    ASA Status:  1 .    NPO Status:  > 8 hours    Plan for General and ETT with Intravenous induction. Maintenance will be Balanced.    PONV prophylaxis:  Ondansetron (or other 5HT-3) and Dexamethasone or Solumedrol  Additional equipment: 2nd IV      Postoperative Care  Postoperative pain management:  IV analgesics.      Consents  Anesthetic plan, risks, benefits and alternatives discussed with:  Patient..        ANESTHESIA PREOP EVALUATION    HPI: Frances Najera is a 41 year old female who presents for Procedure(s):  Laparoscopic Hand Assisted Unrelated Kidney Transplant Donor, Anesthesia Block    PMHx/PSHx/ROS:  Past Medical History:   Diagnosis Date     H/O rosacea        Past Surgical History:   Procedure Laterality Date      SECTION         Past Anes Hx: No personal or family h/o anesthesia problems    Soc Hx:   Social History   Substance Use Topics     Smoking status: Never Smoker     Smokeless tobacco: Never Used     Alcohol use No       Allergies:    Allergies   Allergen Reactions     Codeine Rash       Meds:   Current Facility-Administered Medications   Medication     cefuroxime (ZINACEF) 1.5 g vial to attach to  ml bag for ADULTS or NS 50 ml bag for PEDS     cefuroxime (ZINACEF) 1.5 g vial to attach to  ml bag for ADULTS or NS 50 ml bag for PEDS     fentaNYL (PF) (SUBLIMAZE) injection 25-50 mcg     midazolam (VERSED) injection 1-2 mg     naloxone (NARCAN) injection 0.1-0.4 mg     flumazenil (ROMAZICON) injection 0.2 mg     bupivacaine liposome (EXPAREL) 1.3 % LA inj susp 20 mL     lidocaine 1 % 1 mL     lidocaine (LMX4) kit     sodium chloride (PF) 0.9% PF flush 3 mL     sodium chloride (PF) 0.9% PF flush 3 mL     lactated ringers infusion       NPO Status: >8 hours     Labs:    BMP:  Recent Labs   Lab Test  10/13/17   1153  08/04/17   0727   NA   --   137   POTASSIUM   --   3.7   CHLORIDE   --   105   CO2   --   23   BUN   --   11   CR  0.63  0.74   GLC   --   91   AUSTEN   --   8.8     CBC:   Recent Labs   Lab Test  10/13/17   1153  08/04/17   0727   WBC   --   6.0   RBC   --   4.23   HGB  12.5  12.5   HCT   --   38.0   MCV   --   90   MCH   --   29.6   MCHC   --   32.9   RDW   --   12.9   PLT   --   250     Coags:  Recent Labs   Lab Test  08/04/17   0727   INR  1.00   PTT  38*       Jolene Ayala MD  Staff Anesthesiologist  Pager [phone]  10/19/2017  6:57 AM                          .

## 2017-10-19 NOTE — IP AVS SNAPSHOT
MRN:[id number]                      After Visit Summary   10/19/2017    Frances Najera    MRN: [id number]           Thank you!     Thank you for choosing [facility] for your care. Our goal is always to provide you with excellent care. Hearing back from our patients is one way we can continue to improve our services. Please take a few minutes to complete the written survey that you may receive in the mail after you visit with us. Thank you!        Patient Information     Date Of Birth          1975        About your hospital stay     You were admitted on:  October 19, 2017 You last received care in the:  [address] [facility] [place]    You were discharged on:  October 21, 2017       Who to Call     For medical emergencies, please call 911.  For non-urgent questions about your medical care, please call your primary care provider or clinic, None  For questions related to your surgery, please call your surgery clinic        Attending Provider     Provider Specialty    Nestor Oconnor MD Transplant       Primary Care Provider Fax #    Provider Not In System [phone]      Your next 10 appointments already scheduled     Nov 07, 2017 10:20 AM CST   (Arrive by 10:05 AM)   Kidney Donor Post Op with Nestor Oconnor MD   [facility] Solid Organ Transplant ([facility])    [address] [place] [address] [phone]              Further instructions from your care team       Activity  Resume light activities around your home as soon as possible.   Don't lift anything heavier than 10 pounds until your doctor says it's okay.   Limit sports and strenuous activities for 6 weeks.    You have been instructed to avoid NSAIDs as these medications may affect the remaining kidney. Take other medications as prescribed.  May shower. Gently wash around your incisions with soap and water.   Don't bathe or soak in a tub until your incisions are well healed.   Wear  loose-fitting clothes. This will help you be more comfortable and cause less irritation around your incisions.  Keep wearing abdominal binder when ambulating.  Don't drive until you are no longer taking prescription pain medication.    Diet  Regular diet   Keep yourself well hydrated.   If you are constipated, take a fiber laxative such as Senna.    Follow-Up  You will be seen in clinic around 2-3 weeks after surgery.  If you do not already have an appointment please contact your coordinator.    Notify:  Call your transplant coordinator immediately if you have any of the following:   Swelling, oozing, worsening pain, or unusual redness around the incision, or if:  Fever of 100.5 F or higher   Increasing abdominal pain   Severe diarrhea, bloating, or constipation   Nausea or vomiting     Any concerns or questions, please call your Transplant coordinator:    Phone: [phone].  If they are not available, the on call coordinator/MD will help you with your concern.    Additional Information     If you use hormonal birth control (such as the pill, patch, ring or implants): You'll need a second form of birth control for 7 days (condoms, a diaphragm or contraceptive foam). While in the hospital, you received a medicine called Bridion. Your normal birth control will not work as well for a week after taking this medicine.          Pending Results     No orders found from 10/17/2017 to 10/20/2017.            Statement of Approval     Ordered          10/21/17 0905  I have reviewed and agree with all the recommendations and orders detailed in this document.  EFFECTIVE NOW     Approved and electronically signed by:  Javon Ferrari MD             Admission Information     Date & Time Provider Department Dept. Phone    10/19/2017 Nestor Oconnor MD [address] [facility] [phone]      Your Vitals Were     Blood Pressure Pulse Temperature Respirations Height Weight    123/89 (BP Location: Right arm) 85 97.9  F (36.6  C)  "(Oral) 16 1.65 m (5' 4.96\") 87.5 kg (192 lb 12.8 oz)    Last Period Pulse Oximetry BMI (Body Mass Index)             10/07/2017 96% 32.12 kg/m2         [employer] Information     [employer] gives you secure access to your electronic health record. If you see a primary care provider, you can also send messages to your care team and make appointments. If you have questions, please call your primary care clinic.  If you do not have a primary care provider, please call [phone] and they will assist you.        Care EveryWhere ID     This is your Care EveryWhere ID. This could be used by other organizations to access your [facility] medical records  [id number]        Equal Access to Services     LOPEZ LOW : Nguyễn Meng, sabas means, elder kelley, colin guerrier. So [facility] [phone].    ATENCIÓN: Si habla español, tiene a delgado disposición servicios gratuitos de asistencia lingüística. Llame al [phone].    We comply with applicable federal civil rights laws and [place] laws. We do not discriminate on the basis of race, color, national origin, age, disability, sex, sexual orientation, or gender identity.               Review of your medicines      START taking        Dose / Directions    acetaminophen 325 MG tablet   Commonly known as:  TYLENOL   Used for:  Kidney donor        Dose:  650 mg   Take 2 tablets (650 mg) by mouth every 4 hours as needed for mild pain   Quantity:  100 tablet   Refills:  0       HYDROmorphone 2 MG tablet   Commonly known as:  DILAUDID   Used for:  Kidney donor        Dose:  2-4 mg   Take 1-2 tablets (2-4 mg) by mouth every 4 hours as needed for moderate to severe pain   Quantity:  40 tablet   Refills:  0       ondansetron 4 MG ODT tab   Commonly known as:  ZOFRAN-ODT   Used for:  Kidney donor        Dose:  4 mg   Take 1 tablet (4 mg) by mouth every 6 hours as needed for nausea or vomiting   Quantity:  30 tablet   Refills:  0    "    senna-docusate 8.6-50 MG per tablet   Commonly known as:  SENOKOT-S;PERICOLACE   Used for:  Kidney donor        Dose:  1-2 tablet   Take 1-2 tablets by mouth 2 times daily as needed for constipation   Quantity:  60 tablet   Refills:  0            Where to get your medicines      Some of these will need a paper prescription and others can be bought over the counter. Ask your nurse if you have questions.     Bring a paper prescription for each of these medications     acetaminophen 325 MG tablet    HYDROmorphone 2 MG tablet    ondansetron 4 MG ODT tab    senna-docusate 8.6-50 MG per tablet                Protect others around you: Learn how to safely use, store and throw away your medicines at www.disposemymeds.org.             Medication List: This is a list of all your medications and when to take them. Check marks below indicate your daily home schedule. Keep this list as a reference.      Medications           Morning Afternoon Evening Bedtime As Needed    acetaminophen 325 MG tablet   Commonly known as:  TYLENOL   Take 2 tablets (650 mg) by mouth every 4 hours as needed for mild pain   Last time this was given:  975 mg on 10/21/2017  8:48 AM                                HYDROmorphone 2 MG tablet   Commonly known as:  DILAUDID   Take 1-2 tablets (2-4 mg) by mouth every 4 hours as needed for moderate to severe pain   Last time this was given:  2 mg on 10/21/2017 12:46 PM                                ondansetron 4 MG ODT tab   Commonly known as:  ZOFRAN-ODT   Take 1 tablet (4 mg) by mouth every 6 hours as needed for nausea or vomiting   Last time this was given:  4 mg on 10/21/2017 12:46 PM                                senna-docusate 8.6-50 MG per tablet   Commonly known as:  SENOKOT-S;PERICOLACE   Take 1-2 tablets by mouth 2 times daily as needed for constipation   Last time this was given:  2 tablets on 10/21/2017  8:48 AM

## 2017-10-19 NOTE — OR NURSING
Bilateral TAP block performed without complications.  VSS.  Pt tolerated well.  Will continue to monitor. Pre op procedure care completed at this time. Pt remains alert,oriented and pain free. All consent were signed prior to block procedure.

## 2017-10-19 NOTE — OP NOTE
Transplant Surgery  Operative Note     Procedure Date:  10/19/17    Preoperative Diagnosis:  Healthy kidney donor    Postoperative Diagnosis:  Healthy kidney donor    Procedure:  1. Left Kidney living donor nephrectomy for donation       Secondary Procedure:    None    Surgeon:    Surgeon(s) and Role:     * Nestor Oconnor MD - Primary     * Javon Ferrari MD - Assisting     * Josue Olvera MD - Assisting    Fellow/Assistant:    Yahaira Braga     Specimen:  Left kidney and ureter    Anesthesia:    General    Urine Output: 750 mls  Estimated Blood Loss: 50 mls   Fluids administered:      Intra Op Events: None unexpected     Complications: None.    Findings:  Normal anatomy        Donor UNOS ID:    [id number]  Flush Start time:  10/19/2017 11:07 AM    Arterial Clamp:    10/19/2017 11:04 AM  Arterial anatomy:  Single artery    Venous anatomy:    Single vein  Ureteral anatomy:   Single ureter     Indication: Frances Najera presents for Left Kidney donation. The patient has undergone a thorough medical and psychosocial evaluation and was found suitable for voluntary kidney donation. Risks and benefits of donation were discussed. The patient expressed understanding, was willing to proceed, and provided informed consent.    Final ABO/Crossmatch verification: Prior to incision, I verified the donor ABO and recipient ABO. I visually verified that the donor identification, blood type, and other vital data were compatible with the recipient.      Operative Procedure:  Frances Najera was transported to the operating room, placed on the operating table in the right lateral decubitus position, and a universal timeout was performed. Sequential compression devices were placed on both lower extremities and general endotracheal anesthesia was induced. The patient was given IV antibiotics and Yañez catheter.  The abdomen was shaved, prepped, and draped in the usual sterile fashion.    We made a 6 cm upper midline incision and carried down  thru the linea alba. The peritoneum was opened under direct vision. The hand port was put into position and a left lower quadrant 10 mm port was placed over a trocar with hand assistance. Pneumoperitoneum with CO2 was provided to 12 mmHg. General survey with the laparoscope revealed no unusual findings. An additional 10 mm port was placed in the left lateral abdomen under direct vision.     We released the left colon from its lateral attachments and rotated medially, revealing the kidney and ureter. The ureter was circumferentially dissected free distally toward the pelvis then kidney taking care to preserve its vasculature. The gonadal vein was identified and dissected enbloc with the ureter, and the proximal gonadal vein was doubly ligated and divided near the insertion into the left renal vein. The left adrenal vein was likewise identified, ligated and divided. The renal vein was then circumferentially cleared of extraneous tissue. The kidney and ureter were dissected free posteriorly from the psoas and multiple lumbar veins were clipped and divided.     We created a plane between the left adrenal gland and the upper pole of the kidney with the harmonic scalpel and the upper pole attachments were released. The anterior and inferior aspects of the renal artery at its origin were cleared of investing lymphatics. The patient was given fluid, mannitol and lasix, and the kidney was then dissected free from its lateral attachments allowing full medial rotation. The remaining lymphatics and fat around the renal artery was cleared. The patient was heparinized and the distal ureter and gonadal vein were clipped and divided. Good urine flow was seen. A laparoscopic GAYATRI stapler was fired across the renal artery and then the renal vein.     The kidney was delivered from the hand port, flushed, and taken to recipient room. Protamine was administered for full heparin reversal. Pneumoperitoneum was reestablished and hemostasis  was obtained. Vascular transection sites were visualized and confirmed secure. The colon was placed back in its natural position. The 10 mm port sites were closed with 0-vicryl. The hand port was closed with 0-PDS. Skin incisions were irrigated and closed with 4-0 monocryl and Dermabond. Needle, sponge, and instrument counts were correct x2.  Faculty was present for key portions of the procedure. The patient tolerated the procedure well without apparent complications and was extubated and transferred to PACU in good condition.   Physician Attestation   I was present for the key portions of the procedure and I was immediately available for the entire procedure between opening and closing.    Nestor Oconnor  Date of Service (when I saw the patient): 10/19/17

## 2017-10-19 NOTE — LETTER
Transition Communication Hand-off for Care Transitions to Next Level of Care Provider    Name: Frances Najera  MRN #: [id number]  Primary Care Provider: Provider Not In System     Primary Clinic:       Reason for Hospitalization:  Kidney donor [Z52.4]  Admit Date/Time: 10/19/2017  5:08 AM  Discharge Date: 10.21.17  Payor Source: Payor: MEDICAID [place] / Plan: [place] MEDICAL ASSISTANCE / Product Type: Medicaid /       Reason for Communication Hand-off Referral: Other K donor    Discharge Plan:       Concern for non-adherence with plan of care:   Y/N N  Discharge Needs Assessment:        Follow-up specialty is recommended: Yes    Follow-up plan:  Future Appointments  Date Time Provider Department Center   11/7/2017 10:20 AM Nestor Oconnor MD [facility]       Any outstanding tests or procedures:              Key Recommendations:      Opal Gurrola    AVS/Discharge Summary is the source of truth; this is a helpful guide for improved communication of patient story

## 2017-10-19 NOTE — ANESTHESIA PROCEDURE NOTES
Peripheral Nerve Block Procedure Note    Staff:     Anesthesiologist:  NOEMI CLEMENT    Resident/CRNA:  HILDA NAM    Block performed by resident/CRNA in the presence of a teaching physician    Location: Pre-op  Procedure Start/Stop TImes:      10/19/2017 6:45 AM     10/19/2017 7:00 AM    patient identified, IV checked, site marked, risks and benefits discussed, informed consent, monitors and equipment checked, pre-op evaluation, at physician/surgeon's request and post-op pain management      Correct Patient: Yes      Correct Position: Yes      Correct Site: Yes      Correct Procedure: Yes      Correct Laterality:  Yes    Site Marked:  Yes  Procedure details:     Procedure:  TAP    Laterality:  Bilateral    Position:  Supine    Sterile Prep: chloraprep, mask and sterile gloves      Needle:  Insulated    Needle gauge:  21    Needle length (inches):  4    Ultrasound: Yes      Ultrasound used to identify targeted nerve, plexus, or vascular structure and placed a needle adjacent to it      Permanent Image entered into patiient's record      Abnormal pain on injection: No      Blood Aspirated: No      Paresthesias:  No    Bleeding at site: No      Test dose negative for signs of intravascular injection: Yes      Bolus via:  Needle    Infusion Method:  Single Shot    Blood aspirated via catheter: No      Complications:  None  Assessment/Narrative:     Injection made incrementally with aspirations every (mL):  5

## 2017-10-19 NOTE — PHARMACY-CONSULT NOTE
D/I: 41 year old female s/p kidney. donation surgery on 10/19/17.  Medications have been reviewed by the pharmacist for efficacy, appropriate dose, medication interactions and potential adverse effects.      A: Medications reviewed for this patient as above. No medication issues were noted.  P: Pharmacy will continue to monitor for any potential medication issues, and will make recommendations as appropriate. Medication therapy needs for discharge planning will continue to be addressed throughout the current admission via multidisciplinary rounds and order review.    Kenna Ch, PharmD, BCPS  Pager [phone]

## 2017-10-19 NOTE — ANESTHESIA POSTPROCEDURE EVALUATION
Patient: Frances Najera    Procedure(s):  Laparoscopic Hand Assisted Unrelated Left Kidney Transplant Donor, Anesthesia Block    Diagnosis:Kidney Donor   Diagnosis Additional Information: No value filed.    Anesthesia Type:  General, ETT    Note:  Anesthesia Post Evaluation    Patient location during evaluation: PACU  Patient participation: Able to fully participate in evaluation  Level of consciousness: awake  Pain management: adequate  Airway patency: patent  Cardiovascular status: acceptable  Respiratory status: acceptable  Hydration status: acceptable  PONV: controlled     Anesthetic complications: None          Last vitals:  Vitals:    10/19/17 1345 10/19/17 1400 10/19/17 1415   BP: 120/67 117/68 107/63   Resp: 13 13 10   Temp:      SpO2: 98% 93% 97%         Electronically Signed By: Jolene Ayala MD  October 19, 2017  2:31 PM

## 2017-10-19 NOTE — IP AVS SNAPSHOT
[address] [address]    Phone:  [phone]                                       After Visit Summary   10/19/2017    Frances Najera    MRN: [id number]           After Visit Summary Signature Page     I have received my discharge instructions, and my questions have been answered. I have discussed any challenges I see with this plan with the nurse or doctor.    ..........................................................................................................................................  Patient/Patient Representative Signature      ..........................................................................................................................................  Patient Representative Print Name and Relationship to Patient    ..................................................               ................................................  Date                                            Time    ..........................................................................................................................................  Reviewed by Signature/Title    ...................................................              ..............................................  Date                                                            Time

## 2017-10-19 NOTE — ANESTHESIA CARE TRANSFER NOTE
Patient: Frances Najera    Procedure(s):  Laparoscopic Hand Assisted Unrelated Left Kidney Transplant Donor, Anesthesia Block    Diagnosis: Kidney Donor   Diagnosis Additional Information: No value filed.    Anesthesia Type:   General, ETT     Note:  Airway :Face Mask  Patient transferred to:PACU  Comments: Anesthesia Care Transfer Note      Transfer to:  PACU    Patient Vital Signs:  Stable    Airway:  None    Patient transported to PACU with supplemental O2.  Patient alert and breathing comfortably.  VSS.  Care transferred with report to PACU RN.    Partha Lezama CRNAHandoff Report: Identifed the Patient, Identified the Reponsible Provider, Reviewed the pertinent medical history, Discussed the surgical course, Reviewed Intra-OP anesthesia mangement and issues during anesthesia, Set expectations for post-procedure period and Allowed opportunity for questions and acknowledgement of understanding      Vitals: (Last set prior to Anesthesia Care Transfer)    CRNA VITALS  10/19/2017 1200 - 10/19/2017 1235      10/19/2017             Pulse: 91    SpO2: 100 %    Resp Rate (observed): 14                Electronically Signed By: INGRID Mendoza CRNA  October 19, 2017  12:35 PM

## 2017-10-19 NOTE — PLAN OF CARE
Problem: Patient Care Overview  Goal: Plan of Care/Patient Progress Review  Outcome: No Change  Report received from PACU and pt arrived to unit around 1455. Pt settled in room and frequent vitals set up on monitor. Pt on capnography. NPO, orders to start clears at midnight. All care explained and questions answered. Will continue to monitor and will notify team of changes.

## 2017-10-20 ENCOUNTER — DOCUMENTATION ONLY (OUTPATIENT)
Dept: TRANSPLANT | Facility: CLINIC | Age: 42
End: 2017-10-20

## 2017-10-20 LAB
BUN SERPL-MCNC: 10 MG/DL (ref 7–30)
CREAT SERPL-MCNC: 1.04 MG/DL (ref 0.52–1.04)
GFR SERPL CREATININE-BSD FRML MDRD: 58 ML/MIN/1.7M2
HCT VFR BLD AUTO: 30.6 % (ref 35–47)
HGB BLD-MCNC: 10.1 G/DL (ref 11.7–15.7)

## 2017-10-20 PROCEDURE — 82565 ASSAY OF CREATININE: CPT | Performed by: STUDENT IN AN ORGANIZED HEALTH CARE EDUCATION/TRAINING PROGRAM

## 2017-10-20 PROCEDURE — 99231 SBSQ HOSP IP/OBS SF/LOW 25: CPT | Performed by: NURSE PRACTITIONER

## 2017-10-20 PROCEDURE — 12000025 ZZH R&B TRANSPLANT INTERMEDIATE

## 2017-10-20 PROCEDURE — 84520 ASSAY OF UREA NITROGEN: CPT | Performed by: STUDENT IN AN ORGANIZED HEALTH CARE EDUCATION/TRAINING PROGRAM

## 2017-10-20 PROCEDURE — 85014 HEMATOCRIT: CPT | Performed by: STUDENT IN AN ORGANIZED HEALTH CARE EDUCATION/TRAINING PROGRAM

## 2017-10-20 PROCEDURE — 25000132 ZZH RX MED GY IP 250 OP 250 PS 637: Performed by: STUDENT IN AN ORGANIZED HEALTH CARE EDUCATION/TRAINING PROGRAM

## 2017-10-20 PROCEDURE — 36415 COLL VENOUS BLD VENIPUNCTURE: CPT | Performed by: STUDENT IN AN ORGANIZED HEALTH CARE EDUCATION/TRAINING PROGRAM

## 2017-10-20 PROCEDURE — 25000125 ZZHC RX 250: Performed by: STUDENT IN AN ORGANIZED HEALTH CARE EDUCATION/TRAINING PROGRAM

## 2017-10-20 PROCEDURE — 85018 HEMOGLOBIN: CPT | Performed by: STUDENT IN AN ORGANIZED HEALTH CARE EDUCATION/TRAINING PROGRAM

## 2017-10-20 PROCEDURE — 25000128 H RX IP 250 OP 636: Performed by: STUDENT IN AN ORGANIZED HEALTH CARE EDUCATION/TRAINING PROGRAM

## 2017-10-20 RX ADMIN — ACETAMINOPHEN 975 MG: 325 TABLET, FILM COATED ORAL at 23:46

## 2017-10-20 RX ADMIN — ACETAMINOPHEN 975 MG: 325 TABLET, FILM COATED ORAL at 16:07

## 2017-10-20 RX ADMIN — RANITIDINE HYDROCHLORIDE 150 MG: 150 TABLET, FILM COATED ORAL at 08:32

## 2017-10-20 RX ADMIN — SODIUM CHLORIDE, SODIUM LACTATE, POTASSIUM CHLORIDE, CALCIUM CHLORIDE AND DEXTROSE MONOHYDRATE: 5; 600; 310; 30; 20 INJECTION, SOLUTION INTRAVENOUS at 12:21

## 2017-10-20 RX ADMIN — KETOROLAC TROMETHAMINE 30 MG: 30 INJECTION, SOLUTION INTRAMUSCULAR at 06:06

## 2017-10-20 RX ADMIN — SENNOSIDES AND DOCUSATE SODIUM 2 TABLET: 8.6; 5 TABLET ORAL at 19:31

## 2017-10-20 RX ADMIN — HYDROMORPHONE HYDROCHLORIDE 2 MG: 2 TABLET ORAL at 19:31

## 2017-10-20 RX ADMIN — SENNOSIDES AND DOCUSATE SODIUM 2 TABLET: 8.6; 5 TABLET ORAL at 08:32

## 2017-10-20 RX ADMIN — ONDANSETRON 4 MG: 4 TABLET, ORALLY DISINTEGRATING ORAL at 14:29

## 2017-10-20 RX ADMIN — SODIUM CHLORIDE, SODIUM LACTATE, POTASSIUM CHLORIDE, CALCIUM CHLORIDE AND DEXTROSE MONOHYDRATE: 5; 600; 310; 30; 20 INJECTION, SOLUTION INTRAVENOUS at 03:10

## 2017-10-20 RX ADMIN — HYDROMORPHONE HYDROCHLORIDE 2 MG: 2 TABLET ORAL at 08:46

## 2017-10-20 RX ADMIN — ACETAMINOPHEN 975 MG: 325 TABLET, FILM COATED ORAL at 08:32

## 2017-10-20 RX ADMIN — KETOROLAC TROMETHAMINE 30 MG: 30 INJECTION, SOLUTION INTRAMUSCULAR at 18:06

## 2017-10-20 RX ADMIN — ONDANSETRON 4 MG: 4 TABLET, ORALLY DISINTEGRATING ORAL at 08:32

## 2017-10-20 RX ADMIN — KETOROLAC TROMETHAMINE 30 MG: 30 INJECTION, SOLUTION INTRAMUSCULAR at 12:08

## 2017-10-20 RX ADMIN — Medication 0.2 MG: at 04:05

## 2017-10-20 RX ADMIN — RANITIDINE HYDROCHLORIDE 150 MG: 150 TABLET, FILM COATED ORAL at 19:31

## 2017-10-20 RX ADMIN — KETOROLAC TROMETHAMINE 30 MG: 30 INJECTION, SOLUTION INTRAMUSCULAR at 23:46

## 2017-10-20 ASSESSMENT — PAIN DESCRIPTION - DESCRIPTORS
DESCRIPTORS: ACHING
DESCRIPTORS: ACHING

## 2017-10-20 NOTE — PROGRESS NOTES
"Discharge needs assessed and discharge planning has been conducted with the multidisciplinary transplant care team including pharmacy, nutrition, and social work and transplant coordinator.    Met with Frances on 7A.  provided post op education \"What you need to know after donation\"  Reviewed materials and answered all questions.  Frances was lying comfortably in bed.  Has had some nausea.  Had abdominal binder on.  I thanked Frances for her gift and stated I would talk to her next week.  "

## 2017-10-20 NOTE — PLAN OF CARE
"Problem: Patient Care Overview  Goal: Individualization & Mutuality  Outcome: Improving  /78 (BP Location: Right arm)  Temp 99.3  F (37.4  C) (Oral)  Resp 16  Ht 1.65 m (5' 4.96\")  Wt 84.1 kg (185 lb 6.5 oz)  LMP 10/07/2017  SpO2 95%  BMI 30.89 kg/m2. Pt. continues on capnography & scores are WNL.  SpO2 mid 90's on 1L/NC.  Abdominal pain controlled with sched. Tylenol & prn IV Dilaudid x1.  Pt. had 1 emesis & declined antiemetic stating she felt better afterwards.  Yañez with good output, no stools this shift & not passing gas.  Incision & lap sites c/d/i with liquid bandage.  Pt. slept well between cares.  Encourage activity & IS. Continue to monitor & treat per POC & notify MD with changes.        "

## 2017-10-20 NOTE — PROGRESS NOTES
"REGIONAL ANESTHESIA PAIN SERVICE  SUBJECTIVE: Reports adequate pain control with analgesics and nerve block injections, rating pain 3/10 at rest and 3/10 with activity.  Patient is  tolerating a diet, denies nausea currently.  Denies any weakness, paresthesias, circumoral numbness, metallic taste or tinnitus.       Clinically Aligned Pain Assessment (CAPA):  Comfort (How is your pain?): Comfortably manageable  Change in Pain (Since your last medication/intervention?): About the same  Pain Control (How are your pain treatments working?):  Partially effective pain control    Medications related to Pain Management (Future)    Start     Dose/Rate Route Frequency Ordered Stop    10/20/17 0600  ketorolac (TORADOL) injection 30 mg     Comments:  IF celecoxib was given pre-operatively, start ketorolac 12 hours after celecoxib given.    30 mg Intravenous EVERY 6 HOURS 10/19/17 1525 10/21/17 0559    10/19/17 2000  senna-docusate (SENOKOT-S;PERICOLACE) 8.6-50 MG per tablet 1-2 tablet      1-2 tablet Oral 2 TIMES DAILY 10/19/17 1455      10/19/17 1600  acetaminophen (TYLENOL) tablet 975 mg      975 mg Oral EVERY 8 HOURS 10/19/17 1455 10/22/17 1559    10/19/17 1455  bupivacaine liposome (EXPAREL) LONG ACTING injection was administered into the infiltration site to produce postsurgical analgesia. Duration of action is up to 72 hours, and other \"denisse\" medications should not be given for 96 hours with the exception of the lidocaine 5% patch (LIDODERM) and the lidocaine 10mg in potassium infusions. This entry is for INFORMATION ONLY.       Does not apply CONTINUOUS PRN 10/19/17 1455 10/23/17 1454    10/19/17 1455  HYDROmorphone (DILAUDID) tablet 2-4 mg      2-4 mg Oral EVERY 3 HOURS PRN 10/19/17 1455      10/19/17 1455  HYDROmorphone (DILAUDID) injection 0.2 mg      0.2 mg Intravenous EVERY 1 HOUR PRN 10/19/17 1455            OBJECTIVE:    Blood pressure 118/66, pulse 88, temperature 99.1  F (37.3  C), temperature source Oral, " "resp. rate 16, height 1.65 m (5' 4.96\"), weight 84.1 kg (185 lb 6.5 oz), last menstrual period 10/07/2017, SpO2 97 %.        ASSESSMENT/PLAN:    Frances Najera is a 41 year old female POD #1 s/p LAPAROSCOPIC DONOR HAND ASSISTED KIDNEY LIVING UNRELATED with single shot B/L TAP nerve block injections.  Total bupivacaine 0.25% with epinephrine 1:200,000 20mL total, then liposome bupivacaine (Exparel) 1.3% 20mL total administered 10/19/17 for postop pain control.  Pt is ambulating without difficulty.  No weakness or paresthesias.  No evidence of adverse side effects associated with B/L TAP nerve block injections.  Pt acheiving adequate pain control, with nerve block, oral and IV analgesics.  Anticipate up to 72 hours of pain control with long-acting local anesthetic. Pt will continue to require opioid/nonopioid analgesics for visceral and muscle pain not controlled with local anesthetic.      - NO other local anesthetic use within 96 hours of liposome bupivacaine (Exparel)  - patient received verbal and written instructions about liposome bupivacaine and counseling about pharmacologic and nonpharmacologic measures for acute postoperative pain management  - please call if questions or concerns      INGRID Park CNP  Regional Anesthesia Pain Service  10/20/2017 9:13 AM    24 hour Job Code Pager.  For in-house use only.     Dial * * [phone] and  [place]:  [phone]  [place]: [phone]  Peds:  [phone]  Enter call-back number  May text page using [employer], but NOT [employer].   "

## 2017-10-20 NOTE — PROGRESS NOTES
LIVING DONOR SOCIAL WORK AND INDEPENDENT LIVING DONOR ADVOCATE NOTE:  D:  Ms. Najera is a living kidney donor, POD #1.  I:  I met with her at bedside to thank her for her donation and to assess for any psychosocial needs and to assist with discharge planning.  I assessed the patient's mood/affect, plans for recovery, and any feelings of regret/remorse regarding donation.   A:  Ms. Najera is resting comfortably/sitting up/walking the unit.  She appears to be in a positive mood and affect is congruent.  She states that pain is well controlled.  She reports that her recipient is doing well so far, and she has not been able to see the recipient yet, but hopes to when he is transferred to  later today.  Patient describes donation recovery as very good, and less pain than expected so far.  She and I discussed how to reach me should she have any post operative psychosocial needs.  Ms. Najera reports no feelings of regret or remorse about donation.  She denies any discharge planning needs at this time.  Patient plans to discharge to home with the care and support of her family.   P: Donor /GABRIELE will remain available to assist with any psychosocial and advocacy needs, both inpatient and outpatient, as needed.  Patient is aware of follow-up recommendations and has my contact information.    MARIE Valdes, [facility]  Clinical  and Independent Donor Advocate  [facility]  Pager:  [phone]  Direct:  [phone]

## 2017-10-20 NOTE — PROGRESS NOTES
Transplant Surgery  Inpatient Daily Progress Note  10/20/2017    Subjective: Post-operative day #1 after laparoscopic hand assisted left nephrectomy;     Objective: No acute events overnight, VSS, alert, oriented, afebrile, denies chills, headaches, chest pain, shortness of breath; reports nausea last night, and vomited once; nausea improved after zofran; this AM reports feeling good, nausea and pain are well controlled, having good urine output, not passing flatus, no bowel movements. Ambulating slowly improving.          Physical Exam:     All vitals stable  Patient Vitals for the past 24 hrs:   BP Temp Temp src Pulse Heart Rate Resp SpO2   10/20/17 0734 118/66 99.1  F (37.3  C) Oral 88 - 16 97 %   10/20/17 0404 109/78 99.3  F (37.4  C) Oral - 89 16 95 %   10/19/17 2344 114/67 97.9  F (36.6  C) Oral - 94 16 96 %   10/19/17 1957 109/66 97.9  F (36.6  C) Oral - 94 12 93 %   10/19/17 1556 113/73 98.1  F (36.7  C) Oral - 87 12 95 %   10/19/17 1500 111/64 - - - 95 9 94 %   10/19/17 1430 97/48 - - - 87 12 97 %   10/19/17 1415 107/63 - - - 85 10 97 %   10/19/17 1400 117/68 - - - 90 13 93 %   10/19/17 1345 120/67 - - - 87 13 98 %   10/19/17 1330 121/78 - - - 90 11 93 %   10/19/17 1315 115/72 - - - 101 13 97 %   10/19/17 1300 119/73 98  F (36.7  C) Oral - 101 12 99 %   10/19/17 1245 119/74 - - - 95 14 100 %   10/19/17 1233 109/56 98  F (36.7  C) Oral - - 14 98 %     Wound clean and dry with minimal or no drainage.  Surrounding skin with minimal erythema.      HEENT: sclera anicteric, mmm, conjunctiva pink   CV: NSR   Lungs: CTA B   Abdomen: soft,  no distended, and appropriately tender   Incision(s): C/D/I with mild lizbet-incisional eccymoses  Extremities: no cyanosis or edema     Current Facility-Administered Medications Ordered in Epic   Medication Dose Route Frequency Last Rate Last Dose     bupivacaine liposome (EXPAREL) LONG ACTING injection was administered into the infiltration site to produce postsurgical analgesia.  "Duration of action is up to 72 hours, and other \"denisse\" medications should not be given for 96 hours with the exception of the lidocaine 5% patch (LIDODERM) and the lidocaine 10mg in potassium infusions. This entry is for INFORMATION ONLY.   Does not apply Continuous PRN         dextrose 5% in lactated ringers infusion   Intravenous Continuous 125 mL/hr at 10/20/17 0310       ranitidine (ZANTAC) tablet 150 mg  150 mg Oral BID         naloxone (NARCAN) injection 0.1-0.4 mg  0.1-0.4 mg Intravenous Q2 Min PRN         HYDROmorphone (DILAUDID) tablet 2-4 mg  2-4 mg Oral Q3H PRN         acetaminophen (TYLENOL) tablet 975 mg  975 mg Oral Q8H   975 mg at 10/19/17 2346     ondansetron (ZOFRAN-ODT) ODT tab 4 mg  4 mg Oral Q6H PRN   4 mg at 10/19/17 2035    Or     ondansetron (ZOFRAN) injection 4 mg  4 mg Intravenous Q6H PRN         senna-docusate (SENOKOT-S;PERICOLACE) 8.6-50 MG per tablet 1-2 tablet  1-2 tablet Oral BID         melatonin tablet 1 mg  1 mg Oral At Bedtime PRN         HYDROmorphone (DILAUDID) injection 0.2 mg  0.2 mg Intravenous Q1H PRN   0.2 mg at 10/20/17 0405     ketorolac (TORADOL) injection 30 mg  30 mg Intravenous Q6H   30 mg at 10/20/17 0606     No current [facility]-ordered outpatient prescriptions on file.       All laboratory data related to this surgery reviewed  Results for orders placed or performed during the hospital encounter of 10/19/17 (from the past 24 hour(s))   Hemoglobin and hematocrit   Result Value Ref Range    Hemoglobin 10.0 (L) 11.7 - 15.7 g/dL    Hematocrit 30.9 (L) 35.0 - 47.0 %   Hemoglobin and hematocrit   Result Value Ref Range    Hemoglobin 10.1 (L) 11.7 - 15.7 g/dL    Hematocrit 30.6 (L) 35.0 - 47.0 %   Urea nitrogen   Result Value Ref Range    Urea Nitrogen 10 7 - 30 mg/dL   Creatinine   Result Value Ref Range    Creatinine 1.04 0.52 - 1.04 mg/dL    GFR Estimate 58 (L) >60 mL/min/1.7m2    GFR Estimate If Black 71 >60 mL/min/1.7m2       Assessment: POD #1, doing well.    Plan:   1. " Encouraged ambulation and ISB   2. Continue GI and DVT prophylaxis  3.  Pain control: oral Oxycodone scheduled, oral Tylenol prn, iv Toradol q6h scheduled   4.  Discontinue IVF per protocol  5.  Discontinue Yañez  6.  Advance diet as tolerated   7. Discharge planning: probably tomorrow if pain and nausea is managable on oral agents.    Javon Ferrari  Fellow,   Division of Transplantation  Pager [phone]    Attestation:  Patient has been seen and evaluated by me.   Vital signs, labs, medications and orders were reviewed.   When obtained, diagnostic images were reviewed by me and interpreted as above.    The care plan was discussed with the multidisciplinary team and I agree with the findings and plan in this note, with any differences recorded in blue.            Nestor Oconnor MD  Department of Surgery

## 2017-10-20 NOTE — PLAN OF CARE
Problem: Patient Care Overview  Goal: Plan of Care/Patient Progress Review  1. Decrease nausea  2. Maintain manageable pain levels  3. Increase activity   Outcome: Improving  VSS throughout shift, capnography WNL & to be d/c at 1430. Pt has dealt w/ intermittent nausea and pain & medicated w/ zofran at beginning of shift, scheduled tylenol and toradol, as well as PRN PO dilaudid 2mg. Pt had a bit of her clear liquid diet for lunch, but felt a bit nauseated after. Was supplied with a seaband and aromatherapy sticks. Pt has a L PIV running 5% dextrose LR at 75ml/hr and a saline locked R hand line. Pt reports not passing gas and has not yet voided since d/c of Otilio at 1100. Pt is stand-by assist when ambulating longer distances, as she has yet to do so on this shift. Ambulation encouraged and pt verbalizes understanding of importance. Education on IS continues. Will continue to monitor and follow plan of care.

## 2017-10-20 NOTE — PLAN OF CARE
"Problem: Patient Care Overview  Goal: Plan of Care/Patient Progress Review  Outcome: No Change  /66 (BP Location: Right arm)  Temp 97.9  F (36.6  C) (Oral)  Resp 12  Ht 1.65 m (5' 4.96\")  Wt 84.1 kg (185 lb 6.5 oz)  LMP 10/07/2017  SpO2 93%  BMI 30.89 kg/m2     Pt was transferred from PACU after a left kidney transplant.      Pt on CAPNO with 1 LPM NC. R PIV SL'd and L PIV D5LR at 125ml/hr. Pt complained of some pain and received IV dil x1 and was nauseous had a 50ml emesis and received zofran x1 with effective relief. Yañez putting out 225-125ml of clear urine every 2 hours. Pt is on a clear liquid diet at 0000. Sleepy most of shift alert and oriented x4. Incision open to air midline with a lap site and glued. No numbness or tingling. Will continue to monitor and follow plan of care.        "

## 2017-10-21 VITALS
TEMPERATURE: 97.9 F | SYSTOLIC BLOOD PRESSURE: 123 MMHG | HEART RATE: 85 BPM | RESPIRATION RATE: 16 BRPM | WEIGHT: 192.8 LBS | HEIGHT: 65 IN | BODY MASS INDEX: 32.12 KG/M2 | DIASTOLIC BLOOD PRESSURE: 89 MMHG | OXYGEN SATURATION: 96 %

## 2017-10-21 PROCEDURE — 25000132 ZZH RX MED GY IP 250 OP 250 PS 637: Performed by: STUDENT IN AN ORGANIZED HEALTH CARE EDUCATION/TRAINING PROGRAM

## 2017-10-21 PROCEDURE — 25000125 ZZHC RX 250: Performed by: STUDENT IN AN ORGANIZED HEALTH CARE EDUCATION/TRAINING PROGRAM

## 2017-10-21 RX ORDER — AMOXICILLIN 250 MG
1-2 CAPSULE ORAL 2 TIMES DAILY PRN
Qty: 60 TABLET | Refills: 0 | Status: SHIPPED | OUTPATIENT
Start: 2017-10-21

## 2017-10-21 RX ORDER — ACETAMINOPHEN 325 MG/1
650 TABLET ORAL EVERY 4 HOURS PRN
Qty: 100 TABLET | Refills: 0 | Status: SHIPPED | OUTPATIENT
Start: 2017-10-21

## 2017-10-21 RX ORDER — HYDROMORPHONE HYDROCHLORIDE 2 MG/1
2-4 TABLET ORAL EVERY 4 HOURS PRN
Qty: 40 TABLET | Refills: 0 | Status: SHIPPED | OUTPATIENT
Start: 2017-10-21

## 2017-10-21 RX ORDER — ONDANSETRON 4 MG/1
4 TABLET, ORALLY DISINTEGRATING ORAL EVERY 6 HOURS PRN
Qty: 30 TABLET | Refills: 0 | Status: SHIPPED | OUTPATIENT
Start: 2017-10-21

## 2017-10-21 RX ADMIN — RANITIDINE HYDROCHLORIDE 150 MG: 150 TABLET, FILM COATED ORAL at 08:48

## 2017-10-21 RX ADMIN — ONDANSETRON 4 MG: 4 TABLET, ORALLY DISINTEGRATING ORAL at 12:46

## 2017-10-21 RX ADMIN — HYDROMORPHONE HYDROCHLORIDE 2 MG: 2 TABLET ORAL at 08:47

## 2017-10-21 RX ADMIN — ACETAMINOPHEN 975 MG: 325 TABLET, FILM COATED ORAL at 08:48

## 2017-10-21 RX ADMIN — HYDROMORPHONE HYDROCHLORIDE 2 MG: 2 TABLET ORAL at 12:46

## 2017-10-21 RX ADMIN — SENNOSIDES AND DOCUSATE SODIUM 2 TABLET: 8.6; 5 TABLET ORAL at 08:48

## 2017-10-21 NOTE — DISCHARGE SUMMARY
Discharge Summary    Frances Najera MRN# [id number]   YOB: 1975 Age: 41 year old     Date of Admission:  10/19/2017  Date of Discharge::  10/21/2017  Admitting Physician:  Nestor Oconnor MD  Discharge Physician:  Javon Ferrari MD  Primary Care Physician:        System, Provider Not In          Admission Diagnoses:   Kidney donor [Z52.4]         Discharge Diagnosis:    Kidney donor; s/p laparoscopic hand assisted left nephrectomy          Procedures:    10/19/2017 Laparoscopic hand assisted left nephrectomy         Non-operative procedures:   None performed          Consultations:   PHARMACY IP CONSULT         Imaging Studies:   None performed         Medications Prior to Admission:     No prescriptions prior to admission.            Discharge Medications:     Current Discharge Medication List      START taking these medications    Details   HYDROmorphone (DILAUDID) 2 MG tablet Take 1-2 tablets (2-4 mg) by mouth every 4 hours as needed for moderate to severe pain  Qty: 40 tablet, Refills: 0    Associated Diagnoses: Kidney donor      acetaminophen (TYLENOL) 325 MG tablet Take 2 tablets (650 mg) by mouth every 4 hours as needed for mild pain  Qty: 100 tablet, Refills: 0    Associated Diagnoses: Kidney donor      ondansetron (ZOFRAN-ODT) 4 MG ODT tab Take 1 tablet (4 mg) by mouth every 6 hours as needed for nausea or vomiting  Qty: 30 tablet, Refills: 0    Associated Diagnoses: Kidney donor      senna-docusate (SENOKOT-S;PERICOLACE) 8.6-50 MG per tablet Take 1-2 tablets by mouth 2 times daily as needed for constipation  Qty: 60 tablet, Refills: 0    Associated Diagnoses: Kidney donor                   Medications Discontinued or Adjusted During This Hospitalization:   No change         Antibiotics Prescribed at Discharge:   None prescribed          Brief History of Illness:      Ms Janki Najera is 41 year old female that wanted to donate a kidney; she has been evaluated and approved for a living kidney  donor.            Hospital Course:    Ms Najera has been admitted on 10/19/2017, underwent the same day laparoscopic hand assisted left nephrectomy under general anesthesia. She tolerated well the surgery and anesthesia. Postoperatively she recovered very well without complications; at the time of discharge she is tolerating well general diet, passing flatus, pain is under control on oral Dilaudid, having normal urine output, creatinine 1.04; ambulating independently without issues.      Post-operative pain control included Hydromorphone (dilaudid) IV, Toradol IV and Tylenol oral and will be Hydromorphone oral and Tylenol oral on discharge.          Final Pathology Result:   No pathology submitted         Discharge Instructions and Follow-Up:    Activity  Resume light activities around your home as soon as possible.   Don't lift anything heavier than 10 pounds until your doctor says it's okay.   Limit sports and strenuous activities for 6 weeks.    You have been instructed to avoid NSAIDs as these medications may affect the remaining kidney. Take other medications as prescribed.  May shower. Gently wash around your incisions with soap and water.   Don't bathe or soak in a tub until your incisions are well healed.   Wear loose-fitting clothes. This will help you be more comfortable and cause less irritation around your incisions.  Keep wearing abdominal binder when ambulating.  Don't drive until you are no longer taking prescription pain medication.    Diet  Regular diet   Keep yourself well hydrated.   If you are constipated, take a fiber laxative such as Metamucil.    Follow-Up  You will be seen in clinic around 2-3 weeks after surgery.  If you do not already have an appointment please contact your coordinator.    Notify:  Call your transplant coordinator immediately if you have any of the following:   Swelling, oozing, worsening pain, or unusual redness around the incision, or if:  Fever of 100.5 F or higher    Increasing abdominal pain   Severe diarrhea, bloating, or constipation   Nausea or vomiting     Any concerns or questions, please call your Transplant coordinator:    Phone: [phone].  If they are not available, the on call coordinator/MD will help you with your concern.                          Home Health Care:   Not needed           Discharge Disposition:   Discharged to home      Condition at discharge: Good    Javon Ferrari MD     I evaluated the patient today.  I reviewed the discharge plan with the fellow, and agree with the final assessment and plan for discharge as noted in the discharge summary.  I personally spent  30 minutes or less  today on discharge activities for this patient.            Nestor Oconnor MD  Department of Surgery

## 2017-10-21 NOTE — PLAN OF CARE
"Problem: Patient Care Overview  Goal: Plan of Care/Patient Progress Review  1. Decrease nausea  2. Maintain manageable pain levels  3. Increase activity   Outcome: Adequate for Discharge Date Met:  10/21/17  /89 (BP Location: Right arm)  Pulse 85  Temp 97.9  F (36.6  C) (Oral)  Resp 16  Ht 1.65 m (5' 4.96\")  Wt 87.5 kg (192 lb 12.8 oz)  LMP 10/07/2017  SpO2 96%  BMI 32.12 kg/m2  Incisional pain well managed with scheduled tylenol and PRN dilaudid PO. Zofran ODT, aromatherapy and sea bands for intermittent nausea. Tolerating regular diet with good appetite. Voiding spontaneously in adequate amounts. Passing flatus, no BM. Discussed importance of ambulation, hydration and bowel meds to maintain bowel function. Up independently; steady gait. Incision approximated with dermabond; discussed incisional care and monitoring for signs/symptoms of infection. Patient discharged with family; plan to stay in the area this evening and return home tomorrow. Patient instructed to stop frequently to ambulate on the trip home. Discharge instructions, follow up appointment schedule and medications were reviewed with the patient and her  who articulated an understanding of discharge care. Patient declined wheelchair transport; ambulated off unit with family.       "

## 2017-10-21 NOTE — DISCHARGE INSTRUCTIONS
Activity  Resume light activities around your home as soon as possible.   Don't lift anything heavier than 10 pounds until your doctor says it's okay.   Limit sports and strenuous activities for 6 weeks.    You have been instructed to avoid NSAIDs as these medications may affect the remaining kidney. Take other medications as prescribed.  May shower. Gently wash around your incisions with soap and water.   Don't bathe or soak in a tub until your incisions are well healed.   Wear loose-fitting clothes. This will help you be more comfortable and cause less irritation around your incisions.  Keep wearing abdominal binder when ambulating.  Don't drive until you are no longer taking prescription pain medication.    Diet  Regular diet   Keep yourself well hydrated.   If you are constipated, take a fiber laxative such as Senna.    Follow-Up  You will be seen in clinic around 2-3 weeks after surgery.  If you do not already have an appointment please contact your coordinator.    Notify:  Call your transplant coordinator immediately if you have any of the following:   Swelling, oozing, worsening pain, or unusual redness around the incision, or if:  Fever of 100.5 F or higher   Increasing abdominal pain   Severe diarrhea, bloating, or constipation   Nausea or vomiting     Any concerns or questions, please call your Transplant coordinator:    Phone: [phone].  If they are not available, the on call coordinator/MD will help you with your concern.

## 2017-10-21 NOTE — PHARMACY-CONSULT NOTE
[facility] Prior to Discharge Note  41 year old female s/p kidney donation surgery on 10/19/2017.     Pharmacy has monitored for any potential medication issues.  In anticipation for discharge, medication therapy needs have been addressed daily throughout the current admission via multidisciplinary rounds and/or discussions, order verification, daily clinical pharmacy review, and communication with prescribers.    Isak Mccarthy, CatrinaD, BCPS

## 2017-10-21 NOTE — PLAN OF CARE
Problem: Patient Care Overview  Goal: Plan of Care/Patient Progress Review  1. Decrease nausea  2. Maintain manageable pain levels  3. Increase activity   Outcome: No Change  VSS on RA. Advanced to a regular diet and tolerated well w/o c/o nausea. Scheduled tylenol and toradol given for pain as well as PO dilaudid x1. Up ad jeremy in room. Voiding adequately, didn't save x1. No BM yet, senna given, declined need for suppository at this time, but knows she can get tomorrow if needed. Possible DC tomorrow.

## 2017-10-21 NOTE — PLAN OF CARE
"Problem: Patient Care Overview  Goal: Individualization & Mutuality  Outcome: Improving  /53 (BP Location: Right arm)  Pulse 85  Temp 98.4  F (36.9  C) (Oral)  Resp 16  Ht 1.65 m (5' 4.96\")  Wt 87.5 kg (192 lb 12.8 oz)  LMP 10/07/2017  SpO2 93%  BMI 32.12 kg/m2.  Abdominal pain controlled with sched. Tylenol & Toradol.  Voided adequate amounts, no stools this shift. Incision & lap. sites. c/d/i with liquid bandage. 2 PIVs saline locked.  Pt. slept well between cares. Possible discharge today.  Continue to monitor & treat per POC.        "

## 2017-10-23 ENCOUNTER — CARE COORDINATION (OUTPATIENT)
Dept: CARE COORDINATION | Facility: CLINIC | Age: 42
End: 2017-10-23

## 2017-10-23 ENCOUNTER — TELEPHONE (OUTPATIENT)
Dept: TRANSPLANT | Facility: CLINIC | Age: 42
End: 2017-10-23

## 2017-10-23 NOTE — TELEPHONE ENCOUNTER
Patient is S/P living kidney donor.  Called patient to assess post-operative status.    Patient reports feeling good and incision is dry and clean.    Patient reports normal voiding patterns and normal bowel movements.  Diet is as tolerated.  (Do not worry if you are not hungry - it will come in time.  Stay hydrated.)  Pain is well controlled?  (If it hurts, do not do it.)  Taking narcotics?  (Take narcotics every 4-6 hours if needed.  Start switching to Tylenol during the day if you can & if needed, take narcotics before bed to get a good nights sleep.)  Patient was encouraged to call coordinator during daytime hours or triage line after hours if any problems or concerns arise.    No BM yet.  Is feeling ok.   No pain but having gas.  Is itching.  No rash.  I encouraged t stop dilaudid and only do tylenol.  If rash is not better tomorrow we will talk.  If tylenol s not enough pain med will order something else.

## 2017-10-24 ENCOUNTER — TELEPHONE (OUTPATIENT)
Dept: TRANSPLANT | Facility: CLINIC | Age: 42
End: 2017-10-24

## 2017-10-24 NOTE — TELEPHONE ENCOUNTER
I called Frances to see how her itching is.  She said it was gone since she stopped talking the Dilaudid.  He pain in controlled with Tylenol

## 2017-10-31 ENCOUNTER — TELEPHONE (OUTPATIENT)
Dept: TRANSPLANT | Facility: CLINIC | Age: 42
End: 2017-10-31

## 2017-10-31 NOTE — TELEPHONE ENCOUNTER
Frances contacted me requesting assistance covering her mortgage payment this month.  She is ten days post donor nephrectomy.  I let her know that I would complete a víctor request for our hospital's víctor, and filed it today.

## 2017-11-07 ENCOUNTER — TELEPHONE (OUTPATIENT)
Dept: TRANSPLANT | Facility: CLINIC | Age: 42
End: 2017-11-07

## 2017-11-07 ENCOUNTER — OFFICE VISIT (OUTPATIENT)
Dept: TRANSPLANT | Facility: CLINIC | Age: 42
End: 2017-11-07
Attending: SURGERY
Payer: MEDICAID

## 2017-11-07 DIAGNOSIS — Z00.5 TRANSPLANT DONOR EVALUATION: Primary | ICD-10-CM

## 2017-11-07 NOTE — TELEPHONE ENCOUNTER
I found out this afternoon via a voice mail from [place] PIZARRO in clinic that Frances waited 2 hours to see Elana Oconnor and ended up leaving to drive back home.  I called to apologize to Frances.  I asked that she call tomorrow.   I will let my supervisor know about this incident.

## 2017-11-07 NOTE — LETTER
11/7/2017       RE: Frances Najera   [facility] [address]  [place] [address]     Dear Colleague,    Thank you for referring your patient, Frances Najera, to the [facility] SOLID ORGAN TRANSPLANT at [facility]. Please see a copy of my visit note below.    NA      Again, thank you for allowing me to participate in the care of your patient.      Sincerely,    Nestor Oconnor MD

## 2017-11-07 NOTE — MR AVS SNAPSHOT
After Visit Summary   11/7/2017    Frances Najera    MRN: [id number]           Patient Information     Date Of Birth          1975        Visit Information        Provider Department      11/7/2017 10:20 AM Nestor Oconnor MD [facility] Solid Organ Transplant        Today's Diagnoses     Transplant donor evaluation    -  1       Follow-ups after your visit        Who to contact     If you have questions or need follow up information about today's clinic visit or your schedule please contact [facility] SOLID ORGAN TRANSPLANT directly at [phone].  Normal or non-critical lab and imaging results will be communicated to you by MyChart, letter or phone within 4 business days after the clinic has received the results. If you do not hear from us within 7 days, please contact the clinic through [employer] or phone. If you have a critical or abnormal lab result, we will notify you by phone as soon as possible.  Submit refill requests through [facility] or call your pharmacy and they will forward the refill request to us. Please allow 3 business days for your refill to be completed.          Additional Information About Your Visit        MyChart Information     [facility] gives you secure access to your electronic health record. If you see a primary care provider, you can also send messages to your care team and make appointments. If you have questions, please call your primary care clinic.  If you do not have a primary care provider, please call [phone] and they will assist you.        Care EveryWhere ID     This is your Care EveryWhere ID. This could be used by other organizations to access your [facility] medical records  [id number]        Your Vitals Were     Last Period                   10/07/2017            Blood Pressure from Last 3 Encounters:   10/21/17 123/89   10/13/17 128/85   08/04/17 108/76    Weight from Last 3 Encounters:   10/21/17 87.5 kg (192 lb 12.8 oz)   10/13/17 83.9 kg (184 lb 14.4 oz)    08/04/17 83.3 kg (183 lb 11.2 oz)              Today, you had the following     No orders found for display       Primary Care Provider Fax #    Provider Not In System [phone]                Equal Access to Services     LOPEZ LOW : Hadii laureano yates jareto Soberto, wacocoda luqadaha, qaybta kaalmada allie, colin brownleeanoop chey. So [facility] [phone].    ATENCIÓN: Si habla español, tiene a delgado disposición servicios gratuitos de asistencia lingüística. Llame al [phone].    We comply with applicable federal civil rights laws and [place] laws. We do not discriminate on the basis of race, color, national origin, age, disability, sex, sexual orientation, or gender identity.            Thank you!     Thank you for choosing [facility] SOLID ORGAN TRANSPLANT  for your care. Our goal is always to provide you with excellent care. Hearing back from our patients is one way we can continue to improve our services. Please take a few minutes to complete the written survey that you may receive in the mail after your visit with us. Thank you!             Your Updated Medication List - Protect others around you: Learn how to safely use, store and throw away your medicines at www.disposemymeds.org.          This list is accurate as of: 11/7/17  3:19 PM.  Always use your most recent med list.                   Brand Name Dispense Instructions for use Diagnosis    acetaminophen 325 MG tablet    TYLENOL    100 tablet    Take 2 tablets (650 mg) by mouth every 4 hours as needed for mild pain    Kidney donor       HYDROmorphone 2 MG tablet    DILAUDID    40 tablet    Take 1-2 tablets (2-4 mg) by mouth every 4 hours as needed for moderate to severe pain    Kidney donor       ondansetron 4 MG ODT tab    ZOFRAN-ODT    30 tablet    Take 1 tablet (4 mg) by mouth every 6 hours as needed for nausea or vomiting    Kidney donor       senna-docusate 8.6-50 MG per tablet    SENOKOT-S;PERICOLACE    60 tablet    Take 1-2  tablets by mouth 2 times daily as needed for constipation    Kidney donor

## 2017-11-07 NOTE — LETTER
Date:November 8, 2017      Patient was self referred, no letter generated. Do not send.        [facility] Information

## 2018-01-07 ENCOUNTER — HEALTH MAINTENANCE LETTER (OUTPATIENT)
Age: 43
End: 2018-01-07

## 2018-06-05 ENCOUNTER — DOCUMENTATION ONLY (OUTPATIENT)
Dept: TRANSPLANT | Facility: CLINIC | Age: 43
End: 2018-06-05

## 2019-01-02 ENCOUNTER — TELEPHONE (OUTPATIENT)
Dept: TRANSPLANT | Facility: CLINIC | Age: 44
End: 2019-01-02

## 2019-01-02 NOTE — TELEPHONE ENCOUNTER
I called Babita because her post labs showed bilrubin in her urine.   She said she feels well, no jaundice pain or fever.  I will repeat.  I will send an order an email.

## 2019-01-10 ENCOUNTER — TELEPHONE (OUTPATIENT)
Dept: TRANSPLANT | Facility: CLINIC | Age: 44
End: 2019-01-10

## 2019-01-10 NOTE — TELEPHONE ENCOUNTER
I called Frances to let her know that her repeat urine was good.  No bilirubin.  We will re check in one month.  Orders sent.

## 2020-03-10 ENCOUNTER — HEALTH MAINTENANCE LETTER (OUTPATIENT)
Age: 45
End: 2020-03-10

## 2020-12-27 ENCOUNTER — HEALTH MAINTENANCE LETTER (OUTPATIENT)
Age: 45
End: 2020-12-27

## 2021-04-24 ENCOUNTER — HEALTH MAINTENANCE LETTER (OUTPATIENT)
Age: 46
End: 2021-04-24

## 2021-10-09 ENCOUNTER — HEALTH MAINTENANCE LETTER (OUTPATIENT)
Age: 46
End: 2021-10-09

## 2021-12-04 ENCOUNTER — HEALTH MAINTENANCE LETTER (OUTPATIENT)
Age: 46
End: 2021-12-04

## 2022-05-16 ENCOUNTER — HEALTH MAINTENANCE LETTER (OUTPATIENT)
Age: 47
End: 2022-05-16

## 2022-09-11 ENCOUNTER — HEALTH MAINTENANCE LETTER (OUTPATIENT)
Age: 47
End: 2022-09-11

## 2023-01-23 ENCOUNTER — HEALTH MAINTENANCE LETTER (OUTPATIENT)
Age: 48
End: 2023-01-23

## 2023-06-03 ENCOUNTER — HEALTH MAINTENANCE LETTER (OUTPATIENT)
Age: 48
End: 2023-06-03

## 2024-12-07 NOTE — PROGRESS NOTES
Patient is a transplant patient and will be followed by the transplant team for follow up          Admission Diagnoses:   Kidney donor [Z52.4]     162.56

## (undated) DEVICE — Device

## (undated) DEVICE — SU VICRYL 0 TIE 54" J608H

## (undated) DEVICE — ENDO GELPORT 100/120MM C8XX2

## (undated) DEVICE — SU MONOCRYL 4-0 PS-2 27" UND Y426H

## (undated) DEVICE — TUBING INSUFFLATION W/FILTER CPC TO LUER 620-030-301

## (undated) DEVICE — WIPES FOLEY CARE SURESTEP PROVON DFC100

## (undated) DEVICE — BASIN SET SINGLE STERILE 13752-624

## (undated) DEVICE — LINEN TOWEL PACK X6 WHITE 5487

## (undated) DEVICE — SOL WATER IRRIG 1000ML BOTTLE 2F7114

## (undated) DEVICE — SU DERMABOND ADVANCED .7ML DNX12

## (undated) DEVICE — DRAPE U SPLIT 74X120" 29440

## (undated) DEVICE — ADHESIVE SWIFTSET 0.8ML OCTYL SS6

## (undated) DEVICE — SU VICRYL 3-0 SH 27" J316H

## (undated) DEVICE — LINEN GOWN XLG 5407

## (undated) DEVICE — ANTIFOG SOLUTION W/FOAM PAD 31142527

## (undated) DEVICE — ENDO TROCAR OPTICAL 12MM VERSAONE W/STD FIX CAN UNVCA12STF

## (undated) DEVICE — SUCTION MANIFOLD DORNOCH ULTRA CART UL-CL500

## (undated) DEVICE — CANNULA VESSEL DLP  30001

## (undated) DEVICE — STPL POWERED ECHELON VASC 35MM PVE35A

## (undated) DEVICE — CATH TRAY FOLEY SURESTEP 16FR W/URNE MTR STLK LATEX A303316A

## (undated) DEVICE — SOL NACL 0.9% IRRIG 1000ML BOTTLE 2F7124

## (undated) DEVICE — SU SILK 4-0 TIE 12X30" A303H

## (undated) DEVICE — SUCTION IRR STRYKERFLOW II W/TIP 250-070-520

## (undated) DEVICE — ENDO TROCAR 12MM VERSAONE BLADELESS W/STD FIX CAN NONB12STF

## (undated) DEVICE — ENDO SCOPE WARMER SEAL  C3101

## (undated) DEVICE — ESU HARMONIC LAPAROSCOPIC SHEARS HD 1000I 36CM HARHD36

## (undated) DEVICE — SU PDS II 0 TP-1 60" Z991G

## (undated) DEVICE — CLIP ENDO HEMO-LOC PURPLE LG 544240

## (undated) DEVICE — SU SILK 2-0 TIE 12X30" A305H

## (undated) DEVICE — DRAPE SLUSH/WARMER 66X44" ORS-320

## (undated) DEVICE — CLIP APPLIER ENDO ROTATING 10MM MED/LG ER320

## (undated) DEVICE — SU SILK 3-0 TIE 12X30" A304H

## (undated) DEVICE — SOL NACL 0.9% INJ 1000ML BAG 07983-09

## (undated) DEVICE — TUBING IRRIG CYSTO/BLADDER SET 81" LF 2C4040

## (undated) DEVICE — ENDO CLOSING KIT ENDOCLOSE 173022

## (undated) DEVICE — DRAPE ISOLATION BAG 1003

## (undated) DEVICE — LINEN TOWEL PACK X5 5464

## (undated) DEVICE — ESU ENDO SCISSORS 5MM CVD 5DCS

## (undated) DEVICE — PREP CHLORAPREP 26ML TINTED ORANGE  260815

## (undated) RX ORDER — HYDROMORPHONE HYDROCHLORIDE 1 MG/ML
INJECTION, SOLUTION INTRAMUSCULAR; INTRAVENOUS; SUBCUTANEOUS
Status: DISPENSED
Start: 2017-10-19

## (undated) RX ORDER — KETOROLAC TROMETHAMINE 30 MG/ML
INJECTION, SOLUTION INTRAMUSCULAR; INTRAVENOUS
Status: DISPENSED
Start: 2017-10-19

## (undated) RX ORDER — FENTANYL CITRATE 50 UG/ML
INJECTION, SOLUTION INTRAMUSCULAR; INTRAVENOUS
Status: DISPENSED
Start: 2017-10-19

## (undated) RX ORDER — PAPAVERINE HYDROCHLORIDE 30 MG/ML
INJECTION INTRAMUSCULAR; INTRAVENOUS
Status: DISPENSED
Start: 2017-10-19

## (undated) RX ORDER — MANNITOL 20 G/100ML
INJECTION, SOLUTION INTRAVENOUS
Status: DISPENSED
Start: 2017-10-19

## (undated) RX ORDER — FUROSEMIDE 10 MG/ML
INJECTION INTRAMUSCULAR; INTRAVENOUS
Status: DISPENSED
Start: 2017-10-19

## (undated) RX ORDER — CARDIOPLEG/ORGAN PRESERV NO.1 9-198-2-1
BOTTLE PERFUSION
Status: DISPENSED
Start: 2017-10-19

## (undated) RX ORDER — HEPARIN SODIUM 1000 [USP'U]/ML
INJECTION, SOLUTION INTRAVENOUS; SUBCUTANEOUS
Status: DISPENSED
Start: 2017-10-19

## (undated) RX ORDER — PROPOFOL 10 MG/ML
INJECTION, EMULSION INTRAVENOUS
Status: DISPENSED
Start: 2017-10-19

## (undated) RX ORDER — DEXTROSE, SODIUM CHLORIDE, SODIUM LACTATE, POTASSIUM CHLORIDE, AND CALCIUM CHLORIDE 5; .6; .31; .03; .02 G/100ML; G/100ML; G/100ML; G/100ML; G/100ML
INJECTION, SOLUTION INTRAVENOUS
Status: DISPENSED
Start: 2017-10-19

## (undated) RX ORDER — ONDANSETRON 2 MG/ML
INJECTION INTRAMUSCULAR; INTRAVENOUS
Status: DISPENSED
Start: 2017-10-19

## (undated) RX ORDER — DEXAMETHASONE SODIUM PHOSPHATE 4 MG/ML
INJECTION, SOLUTION INTRA-ARTICULAR; INTRALESIONAL; INTRAMUSCULAR; INTRAVENOUS; SOFT TISSUE
Status: DISPENSED
Start: 2017-10-19